# Patient Record
Sex: FEMALE | Race: OTHER | HISPANIC OR LATINO | Employment: FULL TIME | URBAN - METROPOLITAN AREA
[De-identification: names, ages, dates, MRNs, and addresses within clinical notes are randomized per-mention and may not be internally consistent; named-entity substitution may affect disease eponyms.]

---

## 2018-08-10 PROBLEM — D51.3 OTHER DIETARY VITAMIN B12 DEFICIENCY ANEMIA: Status: ACTIVE | Noted: 2018-08-10

## 2018-08-10 PROBLEM — Z98.84 STATUS POST BARIATRIC SURGERY: Status: ACTIVE | Noted: 2018-08-10

## 2018-08-17 DIAGNOSIS — D50.9 IRON DEFICIENCY ANEMIA, UNSPECIFIED IRON DEFICIENCY ANEMIA TYPE: Primary | ICD-10-CM

## 2018-08-17 DIAGNOSIS — E55.9 VITAMIN D DEFICIENCY: ICD-10-CM

## 2018-08-17 DIAGNOSIS — E53.8 VITAMIN B12 DEFICIENCY: ICD-10-CM

## 2018-08-19 ENCOUNTER — TRANSCRIBE ORDERS (OUTPATIENT)
Dept: ADMINISTRATIVE | Facility: HOSPITAL | Age: 38
End: 2018-08-19

## 2018-08-19 ENCOUNTER — APPOINTMENT (OUTPATIENT)
Dept: LAB | Facility: MEDICAL CENTER | Age: 38
End: 2018-08-19
Payer: COMMERCIAL

## 2018-08-19 DIAGNOSIS — E55.9 VITAMIN D DEFICIENCY: ICD-10-CM

## 2018-08-19 DIAGNOSIS — D50.9 IRON DEFICIENCY ANEMIA, UNSPECIFIED IRON DEFICIENCY ANEMIA TYPE: ICD-10-CM

## 2018-08-19 DIAGNOSIS — Z00.8 HEALTH EXAMINATION IN POPULATION SURVEY: Primary | ICD-10-CM

## 2018-08-19 DIAGNOSIS — Z00.8 HEALTH EXAMINATION IN POPULATION SURVEY: ICD-10-CM

## 2018-08-19 DIAGNOSIS — E53.8 VITAMIN B12 DEFICIENCY: ICD-10-CM

## 2018-08-19 LAB
ALBUMIN SERPL BCP-MCNC: 3.6 G/DL (ref 3.5–5)
ALP SERPL-CCNC: 67 U/L (ref 46–116)
ALT SERPL W P-5'-P-CCNC: 32 U/L (ref 12–78)
ANION GAP SERPL CALCULATED.3IONS-SCNC: 4 MMOL/L (ref 4–13)
AST SERPL W P-5'-P-CCNC: 25 U/L (ref 5–45)
BASOPHILS # BLD AUTO: 0.07 THOUSANDS/ΜL (ref 0–0.1)
BASOPHILS NFR BLD AUTO: 1 % (ref 0–1)
BILIRUB SERPL-MCNC: 0.52 MG/DL (ref 0.2–1)
BUN SERPL-MCNC: 17 MG/DL (ref 5–25)
CALCIUM SERPL-MCNC: 8.3 MG/DL (ref 8.3–10.1)
CHLORIDE SERPL-SCNC: 104 MMOL/L (ref 100–108)
CHOLEST SERPL-MCNC: 149 MG/DL (ref 50–200)
CO2 SERPL-SCNC: 28 MMOL/L (ref 21–32)
CREAT SERPL-MCNC: 0.75 MG/DL (ref 0.6–1.3)
EOSINOPHIL # BLD AUTO: 0.11 THOUSAND/ΜL (ref 0–0.61)
EOSINOPHIL NFR BLD AUTO: 2 % (ref 0–6)
ERYTHROCYTE [DISTWIDTH] IN BLOOD BY AUTOMATED COUNT: 13.6 % (ref 11.6–15.1)
EST. AVERAGE GLUCOSE BLD GHB EST-MCNC: 105 MG/DL
GFR SERPL CREATININE-BSD FRML MDRD: 104 ML/MIN/1.73SQ M
GLUCOSE P FAST SERPL-MCNC: 77 MG/DL (ref 65–99)
HBA1C MFR BLD: 5.3 % (ref 4.2–6.3)
HCT VFR BLD AUTO: 47.3 % (ref 34.8–46.1)
HDLC SERPL-MCNC: 94 MG/DL (ref 40–60)
HGB BLD-MCNC: 15.5 G/DL (ref 11.5–15.4)
IMM GRANULOCYTES # BLD AUTO: 0.01 THOUSAND/UL (ref 0–0.2)
IMM GRANULOCYTES NFR BLD AUTO: 0 % (ref 0–2)
IRON SERPL-MCNC: 79 UG/DL (ref 50–170)
LDLC SERPL CALC-MCNC: 43 MG/DL (ref 0–100)
LYMPHOCYTES # BLD AUTO: 2.13 THOUSANDS/ΜL (ref 0.6–4.47)
LYMPHOCYTES NFR BLD AUTO: 44 % (ref 14–44)
MCH RBC QN AUTO: 29.9 PG (ref 26.8–34.3)
MCHC RBC AUTO-ENTMCNC: 32.8 G/DL (ref 31.4–37.4)
MCV RBC AUTO: 91 FL (ref 82–98)
MONOCYTES # BLD AUTO: 0.55 THOUSAND/ΜL (ref 0.17–1.22)
MONOCYTES NFR BLD AUTO: 11 % (ref 4–12)
NEUTROPHILS # BLD AUTO: 2.07 THOUSANDS/ΜL (ref 1.85–7.62)
NEUTS SEG NFR BLD AUTO: 42 % (ref 43–75)
NONHDLC SERPL-MCNC: 55 MG/DL
NRBC BLD AUTO-RTO: 0 /100 WBCS
PLATELET # BLD AUTO: 303 THOUSANDS/UL (ref 149–390)
PMV BLD AUTO: 10.2 FL (ref 8.9–12.7)
POTASSIUM SERPL-SCNC: 4.1 MMOL/L (ref 3.5–5.3)
PROT SERPL-MCNC: 7.5 G/DL (ref 6.4–8.2)
RBC # BLD AUTO: 5.19 MILLION/UL (ref 3.81–5.12)
SODIUM SERPL-SCNC: 136 MMOL/L (ref 136–145)
TRIGL SERPL-MCNC: 61 MG/DL
TSH SERPL DL<=0.05 MIU/L-ACNC: 1.68 UIU/ML (ref 0.36–3.74)
VIT B12 SERPL-MCNC: 582 PG/ML (ref 100–900)
WBC # BLD AUTO: 4.94 THOUSAND/UL (ref 4.31–10.16)

## 2018-08-19 PROCEDURE — 84443 ASSAY THYROID STIM HORMONE: CPT

## 2018-08-19 PROCEDURE — 83540 ASSAY OF IRON: CPT

## 2018-08-19 PROCEDURE — 83036 HEMOGLOBIN GLYCOSYLATED A1C: CPT

## 2018-08-19 PROCEDURE — 82607 VITAMIN B-12: CPT

## 2018-08-19 PROCEDURE — 85025 COMPLETE CBC W/AUTO DIFF WBC: CPT

## 2018-08-19 PROCEDURE — 80061 LIPID PANEL: CPT

## 2018-08-19 PROCEDURE — 36415 COLL VENOUS BLD VENIPUNCTURE: CPT

## 2018-08-19 PROCEDURE — 80053 COMPREHEN METABOLIC PANEL: CPT

## 2019-03-07 ENCOUNTER — TELEPHONE (OUTPATIENT)
Dept: FAMILY MEDICINE CLINIC | Facility: CLINIC | Age: 39
End: 2019-03-07

## 2019-03-07 DIAGNOSIS — R05.9 COUGH: Primary | ICD-10-CM

## 2019-03-07 RX ORDER — PROMETHAZINE HYDROCHLORIDE AND CODEINE PHOSPHATE 6.25; 1 MG/5ML; MG/5ML
5 SYRUP ORAL EVERY 4 HOURS PRN
Qty: 120 ML | Refills: 0 | Status: SHIPPED | OUTPATIENT
Start: 2019-03-07 | End: 2019-03-12 | Stop reason: SDUPTHER

## 2019-03-12 DIAGNOSIS — R05.9 COUGH: ICD-10-CM

## 2019-03-12 RX ORDER — PROMETHAZINE HYDROCHLORIDE AND CODEINE PHOSPHATE 6.25; 1 MG/5ML; MG/5ML
5 SYRUP ORAL EVERY 4 HOURS PRN
Qty: 120 ML | Refills: 0 | Status: SHIPPED | OUTPATIENT
Start: 2019-03-12 | End: 2019-05-08 | Stop reason: ALTCHOICE

## 2019-03-12 NOTE — TELEPHONE ENCOUNTER
Patient still has a bad cough keeping her up at night and is all out of the medication would like to know if a refill can be sent to the pharmacy

## 2019-05-08 ENCOUNTER — OFFICE VISIT (OUTPATIENT)
Dept: FAMILY MEDICINE CLINIC | Facility: CLINIC | Age: 39
End: 2019-05-08
Payer: COMMERCIAL

## 2019-05-08 VITALS
DIASTOLIC BLOOD PRESSURE: 70 MMHG | HEART RATE: 79 BPM | WEIGHT: 148 LBS | BODY MASS INDEX: 27.23 KG/M2 | SYSTOLIC BLOOD PRESSURE: 100 MMHG | TEMPERATURE: 97.3 F | OXYGEN SATURATION: 98 % | HEIGHT: 62 IN

## 2019-05-08 DIAGNOSIS — Z00.01 ENCOUNTER FOR WELL ADULT EXAM WITH ABNORMAL FINDINGS: Primary | ICD-10-CM

## 2019-05-08 DIAGNOSIS — Z23 NEED FOR PNEUMOCOCCAL VACCINE: ICD-10-CM

## 2019-05-08 DIAGNOSIS — B35.4 TINEA CORPORIS: ICD-10-CM

## 2019-05-08 PROCEDURE — 99395 PREV VISIT EST AGE 18-39: CPT | Performed by: FAMILY MEDICINE

## 2019-05-08 PROCEDURE — 90471 IMMUNIZATION ADMIN: CPT

## 2019-05-08 PROCEDURE — 90732 PPSV23 VACC 2 YRS+ SUBQ/IM: CPT

## 2019-05-08 RX ORDER — CLOTRIMAZOLE AND BETAMETHASONE DIPROPIONATE 10; .64 MG/G; MG/G
CREAM TOPICAL 2 TIMES DAILY
COMMUNITY
End: 2019-05-08 | Stop reason: CLARIF

## 2019-05-08 RX ORDER — NYSTATIN 100000 [USP'U]/G
POWDER TOPICAL 4 TIMES DAILY
Status: ON HOLD | COMMUNITY
End: 2019-08-19

## 2019-05-24 ENCOUNTER — OFFICE VISIT (OUTPATIENT)
Dept: FAMILY MEDICINE CLINIC | Facility: CLINIC | Age: 39
End: 2019-05-24
Payer: COMMERCIAL

## 2019-05-24 VITALS
BODY MASS INDEX: 26.68 KG/M2 | DIASTOLIC BLOOD PRESSURE: 62 MMHG | OXYGEN SATURATION: 99 % | TEMPERATURE: 97.7 F | WEIGHT: 145 LBS | RESPIRATION RATE: 16 BRPM | SYSTOLIC BLOOD PRESSURE: 98 MMHG | HEART RATE: 60 BPM | HEIGHT: 62 IN

## 2019-05-24 DIAGNOSIS — B35.4 TINEA CORPORIS: Primary | ICD-10-CM

## 2019-05-24 PROCEDURE — 99213 OFFICE O/P EST LOW 20 MIN: CPT | Performed by: FAMILY MEDICINE

## 2019-05-24 PROCEDURE — 3008F BODY MASS INDEX DOCD: CPT | Performed by: FAMILY MEDICINE

## 2019-06-13 ENCOUNTER — OFFICE VISIT (OUTPATIENT)
Dept: PLASTIC SURGERY | Facility: MEDICAL CENTER | Age: 39
End: 2019-06-13
Payer: COMMERCIAL

## 2019-06-13 VITALS — WEIGHT: 146.5 LBS | HEIGHT: 63 IN | BODY MASS INDEX: 25.96 KG/M2

## 2019-06-13 DIAGNOSIS — E65 PANNUS, ABDOMINAL: ICD-10-CM

## 2019-06-13 DIAGNOSIS — T85.44XA CAPSULAR CONTRACTURE OF BREAST IMPLANT, INITIAL ENCOUNTER: ICD-10-CM

## 2019-06-13 DIAGNOSIS — L30.4 INTERTRIGO: ICD-10-CM

## 2019-06-13 DIAGNOSIS — Z98.84 STATUS POST BARIATRIC SURGERY: Primary | ICD-10-CM

## 2019-06-13 DIAGNOSIS — B35.4 TINEA CORPORIS: ICD-10-CM

## 2019-06-13 PROCEDURE — 99244 OFF/OP CNSLTJ NEW/EST MOD 40: CPT | Performed by: SURGERY

## 2019-07-11 ENCOUNTER — TELEPHONE (OUTPATIENT)
Dept: PLASTIC SURGERY | Facility: CLINIC | Age: 39
End: 2019-07-11

## 2019-07-30 ENCOUNTER — APPOINTMENT (OUTPATIENT)
Dept: LAB | Facility: CLINIC | Age: 39
End: 2019-07-30
Payer: COMMERCIAL

## 2019-07-30 DIAGNOSIS — Z98.84 S/P GASTRIC BYPASS: ICD-10-CM

## 2019-07-30 DIAGNOSIS — Z13.29 SCREENING FOR THYROID DISORDER: ICD-10-CM

## 2019-07-30 DIAGNOSIS — E65 PANNUS, ABDOMINAL: ICD-10-CM

## 2019-07-30 DIAGNOSIS — Z13.220 LIPID SCREENING: Primary | ICD-10-CM

## 2019-07-30 DIAGNOSIS — Z13.220 LIPID SCREENING: ICD-10-CM

## 2019-07-30 LAB
ANION GAP SERPL CALCULATED.3IONS-SCNC: 6 MMOL/L (ref 4–13)
BASOPHILS # BLD AUTO: 0.05 THOUSANDS/ΜL (ref 0–0.1)
BASOPHILS NFR BLD AUTO: 1 % (ref 0–1)
BUN SERPL-MCNC: 9 MG/DL (ref 5–25)
CALCIUM SERPL-MCNC: 8.1 MG/DL (ref 8.3–10.1)
CHLORIDE SERPL-SCNC: 106 MMOL/L (ref 100–108)
CHOLEST SERPL-MCNC: 140 MG/DL (ref 50–200)
CO2 SERPL-SCNC: 26 MMOL/L (ref 21–32)
CREAT SERPL-MCNC: 0.72 MG/DL (ref 0.6–1.3)
EOSINOPHIL # BLD AUTO: 0.13 THOUSAND/ΜL (ref 0–0.61)
EOSINOPHIL NFR BLD AUTO: 2 % (ref 0–6)
ERYTHROCYTE [DISTWIDTH] IN BLOOD BY AUTOMATED COUNT: 13.7 % (ref 11.6–15.1)
GFR SERPL CREATININE-BSD FRML MDRD: 108 ML/MIN/1.73SQ M
GLUCOSE P FAST SERPL-MCNC: 78 MG/DL (ref 65–99)
HCT VFR BLD AUTO: 41.7 % (ref 34.8–46.1)
HDLC SERPL-MCNC: 85 MG/DL (ref 40–60)
HGB BLD-MCNC: 13.7 G/DL (ref 11.5–15.4)
IMM GRANULOCYTES # BLD AUTO: 0.01 THOUSAND/UL (ref 0–0.2)
IMM GRANULOCYTES NFR BLD AUTO: 0 % (ref 0–2)
LDLC SERPL CALC-MCNC: 43 MG/DL (ref 0–100)
LYMPHOCYTES # BLD AUTO: 2.66 THOUSANDS/ΜL (ref 0.6–4.47)
LYMPHOCYTES NFR BLD AUTO: 39 % (ref 14–44)
MCH RBC QN AUTO: 30.6 PG (ref 26.8–34.3)
MCHC RBC AUTO-ENTMCNC: 32.9 G/DL (ref 31.4–37.4)
MCV RBC AUTO: 93 FL (ref 82–98)
MONOCYTES # BLD AUTO: 0.62 THOUSAND/ΜL (ref 0.17–1.22)
MONOCYTES NFR BLD AUTO: 9 % (ref 4–12)
NEUTROPHILS # BLD AUTO: 3.33 THOUSANDS/ΜL (ref 1.85–7.62)
NEUTS SEG NFR BLD AUTO: 49 % (ref 43–75)
NONHDLC SERPL-MCNC: 55 MG/DL
NRBC BLD AUTO-RTO: 0 /100 WBCS
PLATELET # BLD AUTO: 312 THOUSANDS/UL (ref 149–390)
PMV BLD AUTO: 10.6 FL (ref 8.9–12.7)
POTASSIUM SERPL-SCNC: 3.9 MMOL/L (ref 3.5–5.3)
RBC # BLD AUTO: 4.47 MILLION/UL (ref 3.81–5.12)
SODIUM SERPL-SCNC: 138 MMOL/L (ref 136–145)
TRIGL SERPL-MCNC: 61 MG/DL
TSH SERPL DL<=0.05 MIU/L-ACNC: 1.66 UIU/ML (ref 0.36–3.74)
WBC # BLD AUTO: 6.8 THOUSAND/UL (ref 4.31–10.16)

## 2019-07-30 PROCEDURE — 85025 COMPLETE CBC W/AUTO DIFF WBC: CPT

## 2019-07-30 PROCEDURE — 80061 LIPID PANEL: CPT

## 2019-07-30 PROCEDURE — 36415 COLL VENOUS BLD VENIPUNCTURE: CPT

## 2019-07-30 PROCEDURE — 84443 ASSAY THYROID STIM HORMONE: CPT

## 2019-07-30 PROCEDURE — 80048 BASIC METABOLIC PNL TOTAL CA: CPT

## 2019-08-02 ENCOUNTER — CONSULT (OUTPATIENT)
Dept: PLASTIC SURGERY | Facility: CLINIC | Age: 39
End: 2019-08-02

## 2019-08-02 VITALS
SYSTOLIC BLOOD PRESSURE: 110 MMHG | DIASTOLIC BLOOD PRESSURE: 70 MMHG | RESPIRATION RATE: 12 BRPM | HEART RATE: 64 BPM | BODY MASS INDEX: 25.69 KG/M2 | HEIGHT: 63 IN | WEIGHT: 145 LBS | TEMPERATURE: 98.5 F

## 2019-08-02 DIAGNOSIS — E65 PANNUS, ABDOMINAL: Primary | ICD-10-CM

## 2019-08-02 PROCEDURE — PREOP: Performed by: PLASTIC SURGERY

## 2019-08-07 RX ORDER — OXYCODONE HYDROCHLORIDE 5 MG/1
5 TABLET ORAL EVERY 6 HOURS PRN
Qty: 20 TABLET | Refills: 0 | Status: SHIPPED | OUTPATIENT
Start: 2019-08-07 | End: 2019-10-31 | Stop reason: ALTCHOICE

## 2019-08-07 RX ORDER — DOCUSATE SODIUM 100 MG/1
100 CAPSULE, LIQUID FILLED ORAL 2 TIMES DAILY
Qty: 42 CAPSULE | Refills: 0 | Status: SHIPPED | OUTPATIENT
Start: 2019-08-07 | End: 2019-10-31 | Stop reason: ALTCHOICE

## 2019-08-07 RX ORDER — GABAPENTIN 300 MG/1
300 CAPSULE ORAL 2 TIMES DAILY
Qty: 30 CAPSULE | Refills: 0 | Status: SHIPPED | OUTPATIENT
Start: 2019-08-07 | End: 2019-10-31 | Stop reason: ALTCHOICE

## 2019-08-07 RX ORDER — ACETAMINOPHEN 500 MG
1000 TABLET ORAL 3 TIMES DAILY
Qty: 126 TABLET | Refills: 0 | Status: SHIPPED | OUTPATIENT
Start: 2019-08-07 | End: 2019-08-28

## 2019-08-19 ENCOUNTER — HOSPITAL ENCOUNTER (OUTPATIENT)
Facility: HOSPITAL | Age: 39
Setting detail: OUTPATIENT SURGERY
Discharge: HOME | End: 2019-08-20
Attending: PLASTIC SURGERY | Admitting: PLASTIC SURGERY
Payer: COMMERCIAL

## 2019-08-19 ENCOUNTER — ANESTHESIA (OUTPATIENT)
Dept: PERIOP | Facility: HOSPITAL | Age: 39
End: 2019-08-19
Payer: COMMERCIAL

## 2019-08-19 ENCOUNTER — ANESTHESIA EVENT (OUTPATIENT)
Dept: PERIOP | Facility: HOSPITAL | Age: 39
End: 2019-08-19
Payer: COMMERCIAL

## 2019-08-19 LAB
HIV 1+2 AB+HIV1 P24 AG SERPL QL IA: NORMAL
HIV1 P24 AG SER QL: NORMAL

## 2019-08-19 PROCEDURE — 86803 HEPATITIS C AB TEST: CPT | Performed by: PLASTIC SURGERY

## 2019-08-19 PROCEDURE — 15830 EXC EXCESSIVE SKIN ABDOMEN: CPT | Performed by: PLASTIC SURGERY

## 2019-08-19 PROCEDURE — NC001 PR NO CHARGE: Performed by: PLASTIC SURGERY

## 2019-08-19 PROCEDURE — 87806 HIV AG W/HIV1&2 ANTB W/OPTIC: CPT | Performed by: PLASTIC SURGERY

## 2019-08-19 PROCEDURE — 87340 HEPATITIS B SURFACE AG IA: CPT | Performed by: PLASTIC SURGERY

## 2019-08-19 RX ORDER — ONDANSETRON 2 MG/ML
4 INJECTION INTRAMUSCULAR; INTRAVENOUS EVERY 6 HOURS PRN
Status: DISCONTINUED | OUTPATIENT
Start: 2019-08-19 | End: 2019-08-20 | Stop reason: HOSPADM

## 2019-08-19 RX ORDER — FENTANYL CITRATE/PF 50 MCG/ML
25 SYRINGE (ML) INJECTION
Status: DISCONTINUED | OUTPATIENT
Start: 2019-08-19 | End: 2019-08-19 | Stop reason: HOSPADM

## 2019-08-19 RX ORDER — DIPHENHYDRAMINE HYDROCHLORIDE 50 MG/ML
25 INJECTION INTRAMUSCULAR; INTRAVENOUS EVERY 6 HOURS PRN
Status: DISCONTINUED | OUTPATIENT
Start: 2019-08-19 | End: 2019-08-20 | Stop reason: HOSPADM

## 2019-08-19 RX ORDER — PROPOFOL 10 MG/ML
INJECTION, EMULSION INTRAVENOUS AS NEEDED
Status: DISCONTINUED | OUTPATIENT
Start: 2019-08-19 | End: 2019-08-19 | Stop reason: SURG

## 2019-08-19 RX ORDER — SODIUM CHLORIDE, SODIUM LACTATE, POTASSIUM CHLORIDE, CALCIUM CHLORIDE 600; 310; 30; 20 MG/100ML; MG/100ML; MG/100ML; MG/100ML
75 INJECTION, SOLUTION INTRAVENOUS CONTINUOUS
Status: DISPENSED | OUTPATIENT
Start: 2019-08-19 | End: 2019-08-20

## 2019-08-19 RX ORDER — SODIUM CHLORIDE, SODIUM LACTATE, POTASSIUM CHLORIDE, CALCIUM CHLORIDE 600; 310; 30; 20 MG/100ML; MG/100ML; MG/100ML; MG/100ML
125 INJECTION, SOLUTION INTRAVENOUS CONTINUOUS
Status: DISCONTINUED | OUTPATIENT
Start: 2019-08-19 | End: 2019-08-20

## 2019-08-19 RX ORDER — HYDROMORPHONE HCL/PF 1 MG/ML
0.5 SYRINGE (ML) INJECTION
Status: DISCONTINUED | OUTPATIENT
Start: 2019-08-19 | End: 2019-08-19 | Stop reason: HOSPADM

## 2019-08-19 RX ORDER — CEFAZOLIN SODIUM 1 G/50ML
1000 SOLUTION INTRAVENOUS ONCE
Status: COMPLETED | OUTPATIENT
Start: 2019-08-19 | End: 2019-08-19

## 2019-08-19 RX ORDER — ONDANSETRON 2 MG/ML
4 INJECTION INTRAMUSCULAR; INTRAVENOUS EVERY 4 HOURS PRN
Status: DISCONTINUED | OUTPATIENT
Start: 2019-08-19 | End: 2019-08-19 | Stop reason: HOSPADM

## 2019-08-19 RX ORDER — DOCUSATE SODIUM 100 MG/1
100 CAPSULE, LIQUID FILLED ORAL 2 TIMES DAILY
Status: DISCONTINUED | OUTPATIENT
Start: 2019-08-19 | End: 2019-08-20 | Stop reason: HOSPADM

## 2019-08-19 RX ORDER — ACETAMINOPHEN 325 MG/1
975 TABLET ORAL EVERY 8 HOURS
Status: DISCONTINUED | OUTPATIENT
Start: 2019-08-19 | End: 2019-08-20 | Stop reason: HOSPADM

## 2019-08-19 RX ORDER — GLYCOPYRROLATE 0.2 MG/ML
INJECTION INTRAMUSCULAR; INTRAVENOUS AS NEEDED
Status: DISCONTINUED | OUTPATIENT
Start: 2019-08-19 | End: 2019-08-19 | Stop reason: SURG

## 2019-08-19 RX ORDER — BUPIVACAINE HYDROCHLORIDE 2.5 MG/ML
INJECTION, SOLUTION EPIDURAL; INFILTRATION; INTRACAUDAL AS NEEDED
Status: DISCONTINUED | OUTPATIENT
Start: 2019-08-19 | End: 2019-08-19 | Stop reason: HOSPADM

## 2019-08-19 RX ORDER — HYDROMORPHONE HCL/PF 1 MG/ML
0.5 SYRINGE (ML) INJECTION EVERY 4 HOURS PRN
Status: DISCONTINUED | OUTPATIENT
Start: 2019-08-19 | End: 2019-08-20 | Stop reason: HOSPADM

## 2019-08-19 RX ORDER — ROCURONIUM BROMIDE 10 MG/ML
INJECTION, SOLUTION INTRAVENOUS AS NEEDED
Status: DISCONTINUED | OUTPATIENT
Start: 2019-08-19 | End: 2019-08-19 | Stop reason: SURG

## 2019-08-19 RX ORDER — CALCIUM CARBONATE 200(500)MG
1000 TABLET,CHEWABLE ORAL DAILY PRN
Status: DISCONTINUED | OUTPATIENT
Start: 2019-08-19 | End: 2019-08-20 | Stop reason: HOSPADM

## 2019-08-19 RX ORDER — SODIUM CHLORIDE, SODIUM LACTATE, POTASSIUM CHLORIDE, CALCIUM CHLORIDE 600; 310; 30; 20 MG/100ML; MG/100ML; MG/100ML; MG/100ML
50 INJECTION, SOLUTION INTRAVENOUS CONTINUOUS
Status: DISCONTINUED | OUTPATIENT
Start: 2019-08-19 | End: 2019-08-20

## 2019-08-19 RX ORDER — NEOSTIGMINE METHYLSULFATE 1 MG/ML
INJECTION INTRAVENOUS AS NEEDED
Status: DISCONTINUED | OUTPATIENT
Start: 2019-08-19 | End: 2019-08-19 | Stop reason: SURG

## 2019-08-19 RX ORDER — MIDAZOLAM HYDROCHLORIDE 1 MG/ML
INJECTION INTRAMUSCULAR; INTRAVENOUS AS NEEDED
Status: DISCONTINUED | OUTPATIENT
Start: 2019-08-19 | End: 2019-08-19 | Stop reason: SURG

## 2019-08-19 RX ORDER — DEXAMETHASONE SODIUM PHOSPHATE 4 MG/ML
INJECTION, SOLUTION INTRA-ARTICULAR; INTRALESIONAL; INTRAMUSCULAR; INTRAVENOUS; SOFT TISSUE AS NEEDED
Status: DISCONTINUED | OUTPATIENT
Start: 2019-08-19 | End: 2019-08-19 | Stop reason: SURG

## 2019-08-19 RX ORDER — FENTANYL CITRATE 50 UG/ML
INJECTION, SOLUTION INTRAMUSCULAR; INTRAVENOUS AS NEEDED
Status: DISCONTINUED | OUTPATIENT
Start: 2019-08-19 | End: 2019-08-19 | Stop reason: SURG

## 2019-08-19 RX ORDER — GABAPENTIN 300 MG/1
300 CAPSULE ORAL ONCE
Status: DISCONTINUED | OUTPATIENT
Start: 2019-08-19 | End: 2019-08-19 | Stop reason: HOSPADM

## 2019-08-19 RX ORDER — GABAPENTIN 300 MG/1
300 CAPSULE ORAL 3 TIMES DAILY
Status: DISCONTINUED | OUTPATIENT
Start: 2019-08-19 | End: 2019-08-20 | Stop reason: HOSPADM

## 2019-08-19 RX ORDER — HYDROMORPHONE HCL/PF 1 MG/ML
SYRINGE (ML) INJECTION AS NEEDED
Status: DISCONTINUED | OUTPATIENT
Start: 2019-08-19 | End: 2019-08-19 | Stop reason: SURG

## 2019-08-19 RX ORDER — OXYCODONE HYDROCHLORIDE 5 MG/1
5 TABLET ORAL EVERY 4 HOURS PRN
Status: DISCONTINUED | OUTPATIENT
Start: 2019-08-19 | End: 2019-08-20 | Stop reason: HOSPADM

## 2019-08-19 RX ORDER — ACETAMINOPHEN 325 MG/1
975 TABLET ORAL ONCE
Status: DISCONTINUED | OUTPATIENT
Start: 2019-08-19 | End: 2019-08-19 | Stop reason: HOSPADM

## 2019-08-19 RX ORDER — ONDANSETRON 2 MG/ML
INJECTION INTRAMUSCULAR; INTRAVENOUS AS NEEDED
Status: DISCONTINUED | OUTPATIENT
Start: 2019-08-19 | End: 2019-08-19 | Stop reason: SURG

## 2019-08-19 RX ADMIN — NEOSTIGMINE METHYLSULFATE 3 MG: 1 INJECTION INTRAVENOUS at 16:53

## 2019-08-19 RX ADMIN — DEXAMETHASONE SODIUM PHOSPHATE 4 MG: 4 INJECTION, SOLUTION INTRAMUSCULAR; INTRAVENOUS at 15:00

## 2019-08-19 RX ADMIN — HYDROMORPHONE HYDROCHLORIDE 0.5 MG: 1 INJECTION, SOLUTION INTRAMUSCULAR; INTRAVENOUS; SUBCUTANEOUS at 23:08

## 2019-08-19 RX ADMIN — PROPOFOL 150 MG: 10 INJECTION, EMULSION INTRAVENOUS at 15:00

## 2019-08-19 RX ADMIN — FENTANYL CITRATE 100 MCG: 50 INJECTION INTRAMUSCULAR; INTRAVENOUS at 15:00

## 2019-08-19 RX ADMIN — FENTANYL CITRATE 50 MCG: 50 INJECTION INTRAMUSCULAR; INTRAVENOUS at 16:03

## 2019-08-19 RX ADMIN — ROCURONIUM BROMIDE 20 MG: 10 INJECTION, SOLUTION INTRAVENOUS at 16:06

## 2019-08-19 RX ADMIN — GLYCOPYRROLATE 0.2 MG: 0.2 INJECTION, SOLUTION INTRAMUSCULAR; INTRAVENOUS at 15:12

## 2019-08-19 RX ADMIN — HYDROMORPHONE HYDROCHLORIDE 0.5 MG: 1 INJECTION, SOLUTION INTRAMUSCULAR; INTRAVENOUS; SUBCUTANEOUS at 17:59

## 2019-08-19 RX ADMIN — FENTANYL CITRATE 50 MCG: 50 INJECTION INTRAMUSCULAR; INTRAVENOUS at 15:36

## 2019-08-19 RX ADMIN — HYDROMORPHONE HYDROCHLORIDE 0.5 MG: 1 INJECTION, SOLUTION INTRAMUSCULAR; INTRAVENOUS; SUBCUTANEOUS at 18:05

## 2019-08-19 RX ADMIN — DOCUSATE SODIUM 100 MG: 100 CAPSULE, LIQUID FILLED ORAL at 19:38

## 2019-08-19 RX ADMIN — ONDANSETRON 4 MG: 2 INJECTION INTRAMUSCULAR; INTRAVENOUS at 16:45

## 2019-08-19 RX ADMIN — MIDAZOLAM HYDROCHLORIDE 2 MG: 1 INJECTION, SOLUTION INTRAMUSCULAR; INTRAVENOUS at 14:50

## 2019-08-19 RX ADMIN — OXYCODONE HYDROCHLORIDE 5 MG: 5 TABLET ORAL at 19:38

## 2019-08-19 RX ADMIN — GABAPENTIN 300 MG: 300 CAPSULE ORAL at 20:16

## 2019-08-19 RX ADMIN — HYDROMORPHONE HYDROCHLORIDE 1 MG: 1 INJECTION, SOLUTION INTRAMUSCULAR; INTRAVENOUS; SUBCUTANEOUS at 16:05

## 2019-08-19 RX ADMIN — SODIUM CHLORIDE, SODIUM LACTATE, POTASSIUM CHLORIDE, AND CALCIUM CHLORIDE: .6; .31; .03; .02 INJECTION, SOLUTION INTRAVENOUS at 11:04

## 2019-08-19 RX ADMIN — CEFAZOLIN SODIUM 1000 MG: 1 SOLUTION INTRAVENOUS at 14:55

## 2019-08-19 RX ADMIN — ROCURONIUM BROMIDE 40 MG: 10 INJECTION, SOLUTION INTRAVENOUS at 15:00

## 2019-08-19 RX ADMIN — GLYCOPYRROLATE 0.4 MG: 0.2 INJECTION, SOLUTION INTRAMUSCULAR; INTRAVENOUS at 16:53

## 2019-08-19 RX ADMIN — ACETAMINOPHEN 975 MG: 325 TABLET, FILM COATED ORAL at 19:38

## 2019-08-19 RX ADMIN — LIDOCAINE HYDROCHLORIDE 50 MG: 20 INJECTION, SOLUTION INTRAVENOUS at 15:00

## 2019-08-19 RX ADMIN — SODIUM CHLORIDE, SODIUM LACTATE, POTASSIUM CHLORIDE, AND CALCIUM CHLORIDE: .6; .31; .03; .02 INJECTION, SOLUTION INTRAVENOUS at 16:06

## 2019-08-19 RX ADMIN — SODIUM CHLORIDE, SODIUM LACTATE, POTASSIUM CHLORIDE, AND CALCIUM CHLORIDE 75 ML/HR: .6; .31; .03; .02 INJECTION, SOLUTION INTRAVENOUS at 19:38

## 2019-08-19 RX ADMIN — ROCURONIUM BROMIDE 20 MG: 10 INJECTION, SOLUTION INTRAVENOUS at 15:35

## 2019-08-19 RX ADMIN — FENTANYL CITRATE 25 MCG: 50 INJECTION, SOLUTION INTRAMUSCULAR; INTRAVENOUS at 17:45

## 2019-08-19 RX ADMIN — HYDROMORPHONE HYDROCHLORIDE 0.5 MG: 1 INJECTION, SOLUTION INTRAMUSCULAR; INTRAVENOUS; SUBCUTANEOUS at 17:52

## 2019-08-19 RX ADMIN — FENTANYL CITRATE 25 MCG: 50 INJECTION, SOLUTION INTRAMUSCULAR; INTRAVENOUS at 17:37

## 2019-08-19 NOTE — ANESTHESIA PREPROCEDURE EVALUATION
Review of Systems/Medical History  Patient summary reviewed  Chart reviewed  No history of anesthetic complications     Cardiovascular  Exercise tolerance (METS): >4,     Pulmonary       GI/Hepatic            Endo/Other     GYN       Hematology  No anemia ,     Musculoskeletal       Neurology   Psychology           Physical Exam    Airway    Mallampati score: I  TM Distance: >3 FB  Neck ROM: full     Dental   No notable dental hx     Cardiovascular      Pulmonary      Other Findings        Anesthesia Plan  ASA Score- 2     Anesthesia Type- general with ASA Monitors  Additional Monitors:   Airway Plan: ETT  Comment: General anesthesia, endotracheal tube; standard ASA monitors  Risks and benefits discussed with patient; patient consented and agrees to proceed  I saw and evaluated the patient  If seen with CRNA, we have discussed the anesthetic plan and I am in agreement that the plan is appropriate for the patient  S/p tubal ligation - pt denies any possible pregnancy  Plan Factors-    Induction- intravenous  Postoperative Plan- Plan for postoperative opioid use  Planned trial extubation    Informed Consent- Anesthetic plan and risks discussed with patient  I personally reviewed this patient with the CRNA  Discussed and agreed on the Anesthesia Plan with the CRNA  Sawyer Scruggs

## 2019-08-20 ENCOUNTER — TRANSITIONAL CARE MANAGEMENT (OUTPATIENT)
Dept: FAMILY MEDICINE CLINIC | Facility: CLINIC | Age: 39
End: 2019-08-20

## 2019-08-20 ENCOUNTER — TELEPHONE (OUTPATIENT)
Dept: PLASTIC SURGERY | Facility: CLINIC | Age: 39
End: 2019-08-20

## 2019-08-20 VITALS
HEART RATE: 72 BPM | OXYGEN SATURATION: 99 % | HEIGHT: 64 IN | BODY MASS INDEX: 24.75 KG/M2 | SYSTOLIC BLOOD PRESSURE: 101 MMHG | DIASTOLIC BLOOD PRESSURE: 50 MMHG | RESPIRATION RATE: 18 BRPM | TEMPERATURE: 98.2 F | WEIGHT: 145 LBS

## 2019-08-20 LAB
HBV SURFACE AG SER QL: NORMAL
HCV AB SER QL: NORMAL

## 2019-08-20 PROCEDURE — 99024 POSTOP FOLLOW-UP VISIT: CPT | Performed by: PLASTIC SURGERY

## 2019-08-20 RX ADMIN — OXYCODONE HYDROCHLORIDE 5 MG: 5 TABLET ORAL at 10:24

## 2019-08-20 RX ADMIN — DOCUSATE SODIUM 100 MG: 100 CAPSULE, LIQUID FILLED ORAL at 08:25

## 2019-08-20 RX ADMIN — ACETAMINOPHEN 975 MG: 325 TABLET, FILM COATED ORAL at 03:07

## 2019-08-20 RX ADMIN — GABAPENTIN 300 MG: 300 CAPSULE ORAL at 08:25

## 2019-08-20 RX ADMIN — OXYCODONE HYDROCHLORIDE 5 MG: 5 TABLET ORAL at 06:35

## 2019-08-26 ENCOUNTER — OFFICE VISIT (OUTPATIENT)
Dept: PLASTIC SURGERY | Facility: CLINIC | Age: 39
End: 2019-08-26

## 2019-08-26 VITALS — HEIGHT: 64 IN | BODY MASS INDEX: 24.75 KG/M2 | WEIGHT: 145 LBS

## 2019-08-26 DIAGNOSIS — Z98.890 POST-OPERATIVE STATE: Primary | ICD-10-CM

## 2019-08-26 PROCEDURE — 99024 POSTOP FOLLOW-UP VISIT: CPT | Performed by: PLASTIC SURGERY

## 2019-08-26 NOTE — PATIENT INSTRUCTIONS
PATIENT WILL CONTINUE WITH DRAIN CARE AND IF THE LEFT DRAIN OUTPUT DECREASES BELOW 20 CC  CALL THIS WEEK FOR AN APPT WITH THE NURSE  PATIENT WILL FOLLOW UP IN 2 WEEKS WITH DR aRdha Vance

## 2019-08-26 NOTE — PROGRESS NOTES
Rukhsana Pimentel is a 41 y o  Female status post PANNICULECTOMY done on 8/19/19  The patient is on the office today for vac removal  The vac was removed  The incision line is dry and intact  Steri strips applied to incision line and aquaphor to the umbilicus  The bilateral drain will stay in place output of left 20 cc and on the right more than 30 cc  The patietn will follow up in one week for drain check and then in two weeks with Dr Susu Aranda  Photographs were taken

## 2019-08-27 ENCOUNTER — OFFICE VISIT (OUTPATIENT)
Dept: FAMILY MEDICINE CLINIC | Facility: CLINIC | Age: 39
End: 2019-08-27
Payer: COMMERCIAL

## 2019-08-27 VITALS
WEIGHT: 143 LBS | DIASTOLIC BLOOD PRESSURE: 60 MMHG | HEART RATE: 79 BPM | HEIGHT: 64 IN | TEMPERATURE: 98.1 F | OXYGEN SATURATION: 100 % | BODY MASS INDEX: 24.41 KG/M2 | SYSTOLIC BLOOD PRESSURE: 100 MMHG

## 2019-08-27 DIAGNOSIS — E65 PANNUS, ABDOMINAL: Primary | ICD-10-CM

## 2019-08-27 PROCEDURE — 99496 TRANSJ CARE MGMT HIGH F2F 7D: CPT | Performed by: FAMILY MEDICINE

## 2019-08-27 NOTE — PROGRESS NOTES
Assessment/Plan:     Pannus, abdominal  Status post recent surgery in August 18, 2019 patient still has drainage on patient will continue to follow up with the surgeon InCase of fever or dysuria cough to call the office       Diagnoses and all orders for this visit:    Pannus, abdominal         Subjective:     Patient ID: Hugo White is a 41 y o  female  Patient and office status post the hospitalization patient and did the have the history of gastric bypass and she lost weight and she did have a pannus abdomen and she start having some rash in and the problem with it and have a surgery in August /18/19 and patient was stable to discharge with the pain medication today in the office she deny any fever no cough no dysuria no increased frequency urination no blood in the urine no constipation she is taking the oxycodone for the pain at night and during the day she is taking Tylenol  The review hospital record with the patient and medication with the patient      Review of Systems   Constitutional: Negative for fatigue and fever  HENT: Negative for ear pain, sinus pressure, sinus pain and sore throat  Eyes: Negative for pain and redness  Respiratory: Negative for cough, chest tightness and shortness of breath  Cardiovascular: Negative for chest pain, palpitations and leg swelling  Gastrointestinal: Negative for abdominal pain, blood in stool, constipation, diarrhea and nausea  Genitourinary: Negative for flank pain, frequency and hematuria  Musculoskeletal: Negative for back pain and joint swelling  Skin: Negative for rash  Neurological: Negative for dizziness, numbness and headaches  Hematological: Does not bruise/bleed easily  Objective:     Physical Exam   Constitutional: She is oriented to person, place, and time  She appears well-developed and well-nourished  HENT:   Head: Normocephalic     Right Ear: External ear normal    Left Ear: External ear normal    Eyes: Conjunctivae and EOM are normal  Right eye exhibits no discharge  Left eye exhibits no discharge  Neck: No JVD present  Cardiovascular: Normal rate, regular rhythm and normal heart sounds  Exam reveals no gallop  No murmur heard  Pulmonary/Chest: Effort normal  No respiratory distress  She has no wheezes  She has no rales  She exhibits no tenderness  Abdominal: She exhibits no mass  There is no tenderness  There is no rebound  Site of incision no erythema no tenderness no drainage ,drainge tube in place   Musculoskeletal: She exhibits no edema or tenderness  Neurological: She is alert and oriented to person, place, and time  Skin: No rash noted  No erythema  Vitals:    08/27/19 0836   BP: 100/60   Pulse: 79   Temp: 98 1 °F (36 7 °C)   TempSrc: Tympanic   SpO2: 100%   Weight: 64 9 kg (143 lb)   Height: 5' 3 5" (1 613 m)       Transitional Care Management Review:  Stephanie Stoddard is a 41 y o  female here for TCM follow up  During the TCM phone call patient stated:    TCM Call (since 7/28/2019)     Date and time call was made  8/20/2019  4:07 PM    Hospital care reviewed  Records reviewed    Patient was hospitialized at  [address] [place] [address]    Date of Admission  08/19/19    Date of discharge  08/20/19    Diagnosis  panniculectomy     Disposition  Home    Were the patients medications reviewed and updated  No    Current Symptoms  None      TCM Call (since 7/28/2019)     Post hospital issues  None    Should patient be enrolled in anticoag monitoring? No    Scheduled for follow up?   Yes    Referrals needed  none     Did you obtain your prescribed medications  Yes    Do you need help managing your prescriptions or medications  No    Is transportation to your appointment needed  No    I have advised the patient to call PCP with any new or worsening symptoms  beena BOWIE     Living Arrangements  Spouse or Significiant other    Support System  Family    The type of support provided  Emotional; Financial; Physical    Do you have social support  Yes, as much as I need    Are you recieving any outpatient services  No    Are you recieving home care services  No    Are you using any community resources  No    Current waiver services  No    Have you fallen in the last 12 months  No    Interperter language line needed  No    Counseling  Patient    Counseling topics  Importance of RX compliance          Lionel Grace MD

## 2019-08-28 NOTE — ASSESSMENT & PLAN NOTE
Status post recent surgery in August 18, 2019 patient still has drainage on patient will continue to follow up with the surgeon InCase of fever or dysuria cough to call the office

## 2019-09-03 ENCOUNTER — OFFICE VISIT (OUTPATIENT)
Dept: PLASTIC SURGERY | Facility: CLINIC | Age: 39
End: 2019-09-03

## 2019-09-03 VITALS — WEIGHT: 142.3 LBS | HEIGHT: 63 IN | BODY MASS INDEX: 25.21 KG/M2

## 2019-09-03 DIAGNOSIS — Z98.890 STATUS POST PANNICULECTOMY: Primary | ICD-10-CM

## 2019-09-03 PROCEDURE — 99024 POSTOP FOLLOW-UP VISIT: CPT | Performed by: PHYSICIAN ASSISTANT

## 2019-09-03 NOTE — PROGRESS NOTES
08/19/19 1458         Procedure: PANNICULECTOMY (N/A Abdomen)   Anesthesia type: general     Incisions are clean dry intact after steristrips were removed  Bacitracin applied and covered with ABD's then binder  Drains were removed without difficulty  Pt can shower with mild soap and apply dressings as above  She will followup in 2 weeks

## 2019-09-11 ENCOUNTER — TELEPHONE (OUTPATIENT)
Dept: PLASTIC SURGERY | Facility: CLINIC | Age: 39
End: 2019-09-11

## 2019-09-11 NOTE — LETTER
September 11, 2019     Patient: Parmjit Shaffer   YOB: 1980           To Whom it May Concern:    Adrian Wallace is under my professional care  She may return to work on 09/16/2019 with no restrictions       If you have any questions or concerns, please don't hesitate to call           Sincerely,          Tala Kenny PA-C      CC: No Recipients

## 2019-09-18 ENCOUNTER — TELEPHONE (OUTPATIENT)
Dept: PLASTIC SURGERY | Facility: CLINIC | Age: 39
End: 2019-09-18

## 2019-09-18 NOTE — TELEPHONE ENCOUNTER
Rec'd email from [place], [address] [place] [address], that stated:     "Hey,  Pt called and c/x for tomorrow  She said all is ok and really doesnt think it is necessary for another post op  I told her I would speak with you and call her back if an appt needs to be made  I did document in the notes that she stated all was fine  Let me know "    Called patient, scheduled her a 2 month f/u

## 2019-10-06 NOTE — PROGRESS NOTES
Assessment/Plan   Diagnoses and all orders for this visit:    Pannus, abdominal  -     gabapentin (NEURONTIN) 300 mg capsule; Take 1 capsule (300 mg total) by mouth 2 (two) times a day  -     acetaminophen (TYLENOL) 500 mg tablet; Take 2 tablets (1,000 mg total) by mouth 3 (three) times a day for 21 days  -     oxyCODONE (ROXICODONE) 5 mg immediate release tablet; Take 1 tablet (5 mg total) by mouth every 6 (six) hours as needed for moderate painMax Daily Amount: 20 mg (Patient not taking: Reported on 9/3/2019)  -     docusate sodium (COLACE) 100 mg capsule; Take 1 capsule (100 mg total) by mouth 2 (two) times a day for 21 days      During today's 30 minute visit, all of which was dedicated to counseling, I reviewed the anticipated preoperative, intraoperative, and postoperative courses  I informed her that the nurses will contact the patient the day prior to surgery in order to discuss orientation and logistical information  I will then see her the day of surgery where I will answer any last minute questions and perform my preoperative markings  We will then proceed to the operating room for an anticipated 2 hour procedure under general anesthesia, specifically a panniculectomy  For each procedure, I reviewed the incisions/scars, duration, recovery time, postoperative restrictions, and follow-up  When applicable, I discussed hospitalization, dressing changes, drains, and donor site morbidity        All risks, benefits, and options, including but not limited to, pain, scarring, bleeding, infection, asymmetry, contour deformity, damage to adjacent nerves, vessels, and organs, hematoma, seroma, paresthesias, anesthesia (temporary versus permanent), skin necrosis, fat necrosis, flap necrosis, wound dehiscence with need for healing by secondary intention and postoperative dressing changes, hypertrophic and/or keloid scar formation, deep venous thrombosis, pulmonary embolism, recurrence, and need for revision and/or reoperation were fully explained to the patient  All questions were answered  Once full understanding of the anticipated procedure was verified, informed consent was obtained  The patient will have an expected six-week postoperative recovery period during which time she will have activity restrictions  Specifically, this includes, but is not limited to, avoidance of heavy lifting (nothing heavier than a gallon of milk), avoidance of strenuous activity, avoidance of any activity which makes her hurt or perspire, avoidance of anything that places tension across the incisions, avoidance of submerging the incisions or operative area in water (including bathing in a bathtub, swimming, etc ), and avoidance of dirty, yanira, or contaminated environments  An abdominal binder would be recommended to be worn 24/7 for 6 weeks, except when bathing or when washing  It was emphasized to the patient that postoperatively, it is mandated to employ a high-protein, low salt diet, take deep breaths in order to avoid atelectasis, and ambulate at least 5 times a day in order to avoid deep venous thrombosis and pulmonary embolism  At the conclusion of our conversation, the patient wished to proceed with surgery  [facility] [id number] [facility]   [address] [place] [address] [address]   [address] [place] [address]   Office: [phone]      Subjective   Patient ID: Shabnam Cheng is a 42 y o  female  Shabnam Cheng is being seen today for preoperative evaluation  Since last visit, there has not been any changes in the patient's overall health status and/or surgical plan to report  Patient has a history of morbid obesity       They present preoperatively for planned: panniculectomy    The following history of N/V post operative: none    Nicotine status: none    Vitals:    08/02/19 1325   BP: 110/70   Pulse: 64   Resp: 12   Temp: 98 5 °F (36 9 °C)     HPI    The following portions of the patient's history were reviewed and updated as appropriate: allergies, current medications, past family history, past medical history, past social history, past surgical history and problem list     Review of Systems   All other systems reviewed and are negative  Objective   Physical Exam   Constitutional  She appears well-developed and well-nourished  Cardiovascular: Normal rate  Pulmonary/Chest  Effort normal      Psychiatric  She has a normal mood and affect  Her behavior is normal        Body mass index is 25 69 kg/m²

## 2019-10-31 ENCOUNTER — OFFICE VISIT (OUTPATIENT)
Dept: PLASTIC SURGERY | Facility: CLINIC | Age: 39
End: 2019-10-31

## 2019-10-31 VITALS — WEIGHT: 133.25 LBS | BODY MASS INDEX: 22.75 KG/M2 | HEIGHT: 64 IN

## 2019-10-31 DIAGNOSIS — Z98.890 STATUS POST PANNICULECTOMY: Primary | ICD-10-CM

## 2019-10-31 PROCEDURE — 99024 POSTOP FOLLOW-UP VISIT: CPT | Performed by: PLASTIC SURGERY

## 2019-10-31 NOTE — PROGRESS NOTES
Steve Serge is 2 months post-op: panniculectomy  Presenting for routine follow-up  S:  Doing well  Patient has noted no pain or discomfort  O:  Abdomen with well healed scar and acceptable contour  Some fullness central flap as expected  A:  Satisfactory course  Patient very happy with results    P: Scar massage and Suncreen protections   Recommended if would improvement of fullness liposuction in the future would help  Patient to return in 10 months  Patient is to return as needed for redness, swelling, discomfort, or any concern about her surgery

## 2020-03-03 DIAGNOSIS — Z20.828 EXPOSURE TO THE FLU: Primary | ICD-10-CM

## 2020-03-03 RX ORDER — OSELTAMIVIR PHOSPHATE 75 MG/1
75 CAPSULE ORAL DAILY
Qty: 10 CAPSULE | Refills: 0 | Status: SHIPPED | OUTPATIENT
Start: 2020-03-03 | End: 2020-03-13

## 2020-03-04 ENCOUNTER — TELEPHONE (OUTPATIENT)
Dept: FAMILY MEDICINE CLINIC | Facility: CLINIC | Age: 40
End: 2020-03-04

## 2020-07-31 ENCOUNTER — OFFICE VISIT (OUTPATIENT)
Dept: FAMILY MEDICINE CLINIC | Facility: CLINIC | Age: 40
End: 2020-07-31
Payer: COMMERCIAL

## 2020-07-31 VITALS
DIASTOLIC BLOOD PRESSURE: 64 MMHG | RESPIRATION RATE: 16 BRPM | HEIGHT: 63 IN | BODY MASS INDEX: 25.87 KG/M2 | TEMPERATURE: 97.7 F | OXYGEN SATURATION: 99 % | WEIGHT: 146 LBS | HEART RATE: 54 BPM | SYSTOLIC BLOOD PRESSURE: 102 MMHG

## 2020-07-31 DIAGNOSIS — Z98.84 STATUS POST BARIATRIC SURGERY: ICD-10-CM

## 2020-07-31 DIAGNOSIS — D50.8 OTHER IRON DEFICIENCY ANEMIA: ICD-10-CM

## 2020-07-31 DIAGNOSIS — B35.4 TINEA CORPORIS: ICD-10-CM

## 2020-07-31 DIAGNOSIS — Z00.01 ENCOUNTER FOR WELL ADULT EXAM WITH ABNORMAL FINDINGS: Primary | ICD-10-CM

## 2020-07-31 PROCEDURE — 99395 PREV VISIT EST AGE 18-39: CPT | Performed by: FAMILY MEDICINE

## 2020-07-31 RX ORDER — CLOTRIMAZOLE 1 %
CREAM (GRAM) TOPICAL 2 TIMES DAILY
Qty: 30 G | Refills: 0 | Status: SHIPPED | OUTPATIENT
Start: 2020-07-31 | End: 2021-09-01 | Stop reason: ALTCHOICE

## 2020-07-31 NOTE — PATIENT INSTRUCTIONS

## 2020-07-31 NOTE — PROGRESS NOTES
Depression Screening and Follow-up Plan: Patient's depression screening was positive with a PHQ-2 score of 0  Clincally patient does not have depression  No treatment is required  FAMILY PRACTICE HEALTH MAINTENANCE OFFICE VISIT  [facility]    NAME: Ihsan White  AGE: 42 y o  SEX: female  : 1980     DATE: 2020    Assessment and Plan     1  Encounter for well adult exam with abnormal findings  Assessment & Plan:  Advice and education were given regarding nutrition, aerobic exercises, weight bearing exercises, cardiovascular risk reduction, fall risk reduction, and age appropriate supplements  The patient was counseled regarding instructions for management, risk factor reductions, prognosis, risks and benefits of treatment options, patient and family education, and importance of compliance with treatment  2  BMI 25 0-25 9,adult  Assessment & Plan:  Chronic status post gastric bypass current the patient portion control low carb and low-fat diet and increased physical activity 30-60 minute a day 5 days a week      3  Tinea corporis  Assessment & Plan:  Asymptomatic in bilateral upper arm recommend to apply Lotrimin cream twice a day for 2 weeks recommend the important keep the area dry    Orders:  -     clotrimazole (LOTRIMIN) 1 % cream; Apply topically 2 (two) times a day    4  Other iron deficiency anemia  -     CBC and differential; Future  -     Basic metabolic panel; Future  -     Ferritin; Future  -     Iron; Future  -     Vitamin B12; Future  -     Vitamin D 25 hydroxy; Future    5  Status post bariatric surgery  -     CBC and differential; Future  -     Basic metabolic panel; Future  -     Ferritin; Future  -     Iron; Future  -     Vitamin B12; Future  -     Vitamin D 25 hydroxy;  Future      · Patient Counseling:   · Nutrition: Stressed importance of a well balanced diet, moderation of sodium/saturated fat, caloric balance and sufficient intake of fiber  · Exercise: Stressed the importance of regular exercise with a goal of 150 minutes per week  · Dental Health: Discussed daily flossing and brushing and regular dental visits     · Immunizations reviewed: Up To Date  · Discussed benefits of:  Cervical Cancer screening   BMI Counseling: Body mass index is 25 66 kg/m²  Discussed with patient's BMI with her       Chief Complaint     Chief Complaint   Patient presents with    Physical Exam     patient is here today for her yearly physical exam     Rash     patient states she keeps getting bilateral arm irritations gets very sore painful and red under her arms        History of Present Illness     Patient here for annual physical exam and she is concerned about rash  Patient deny any chest pain short of breath no palpitation no cough no wheezing no hematosis deny any headache blurred vision no weakness or lateralized the symptom no abdomen pain nausea vomiting or diarrhea no renal problem  no fever no change in the weight and no change in the mood  Patient also concerned about the rash on bilateral upper arm since patient's he had gastric bypass and lost weight she had lose skin and the with the rubbing and a get redness the not itchy and not painful      Well Adult Physical   Patient here for a comprehensive physical exam       Diet and Physical Activity  Diet:  Intermittent Fasting  Exercise: infrequently      Depression Screen  PHQ-9 Depression Screening    PHQ-9:    Frequency of the following problems over the past two weeks:       Little interest or pleasure in doing things:  0 - not at all  Feeling down, depressed, or hopeless:  0 - not at all  PHQ-2 Score:  0          General Health  Hearing: Normal:  bilateral  Vision: no vision problems  Dental: regular dental visits    Reproductive Health  No issues  and Regular Periods      The following portions of the patient's history were reviewed and updated as appropriate: allergies, current medications, past family history, past medical history, past social history, past surgical history and problem list     Review of Systems     Review of Systems   Constitutional: Negative for activity change, appetite change, fatigue and fever  HENT: Negative for congestion, ear pain, sinus pressure, sinus pain and sore throat  Eyes: Negative for pain, discharge, redness and itching  Respiratory: Negative for cough, chest tightness, shortness of breath and stridor  Cardiovascular: Negative for chest pain, palpitations and leg swelling  Gastrointestinal: Negative for abdominal pain, blood in stool, constipation, diarrhea and nausea  Endocrine: Negative for heat intolerance and polydipsia  Genitourinary: Negative for dysuria, flank pain, frequency and hematuria  Musculoskeletal: Negative for back pain, joint swelling and neck pain  Skin: Positive for rash  Negative for pallor  On B/L upper arm   Neurological: Negative for dizziness, tremors, weakness, numbness and headaches  Hematological: Does not bruise/bleed easily  Psychiatric/Behavioral: Negative for agitation and behavioral problems         Past Medical History     Past Medical History:   Diagnosis Date    Anemia     resolved    Tobacco abuse     Vitamin D deficiency        Past Surgical History     Past Surgical History:   Procedure Laterality Date    GASTRIC BYPASS  2008    PANNICULECTOMY N/A 8/19/2019    Procedure: PANNICULECTOMY;  Surgeon: Varun Brown MD;  [facility];  Service: [address] [place] [address]    TUBAL LIGATION  03/2014       Social History     Social History     Socioeconomic History    Marital status: /Civil Union     Spouse name: None    Number of children: None    Years of education: None    Highest education level: None   Occupational History    None   Social Needs    Financial resource strain: Not hard at all   [place] insecurity:     Worry: Never true     Inability: Never true    Transportation needs:     Medical: No     Non-medical: No   Tobacco Use    Smoking status: Former Smoker     Last attempt to quit: 2/13/2017     Years since quitting: 3 4    Smokeless tobacco: Former User    Tobacco comment: quite smoking   Substance and Sexual Activity    Alcohol use: Yes     Frequency: Monthly or less     Drinks per session: 1 or 2     Binge frequency: Never    Drug use: Never    Sexual activity: Yes     Partners: Male     Birth control/protection: Female Sterilization   Lifestyle    Physical activity:     Days per week: 3 days     Minutes per session: 30 min    Stress: Not at all   Relationships    Social connections:     Talks on phone: More than three times a week     Gets together: More than three times a week     Attends Worship service: More than 4 times per year     Active member of club or organization: No     Attends meetings of clubs or organizations: Never     Relationship status:     Intimate partner violence:     Fear of current or ex partner: No     Emotionally abused: No     Physically abused: No     Forced sexual activity: No   Other Topics Concern    None   Social History Narrative    Children: Yes    Hand dominance: right       Family History     Family History   Problem Relation Age of Onset    Hypertension Family     Diabetes Family         Mellitus    Diabetes Mother     Arthritis Mother     Diabetes Father     COPD Father     Asthma Sister     Allergies Sister        Current Medications       Current Outpatient Medications:     clotrimazole (LOTRIMIN) 1 % cream, Apply topically 2 (two) times a day, Disp: 30 g, Rfl: 0     Allergies     No Known Allergies    Objective     /64 (BP Location: Left arm, Patient Position: Sitting, Cuff Size: Adult)   Pulse (!) 54   Temp 97 7 °F (36 5 °C) (Tympanic)   Resp 16   Ht 5' 3 25" (1 607 m)   Wt 66 2 kg (146 lb)   LMP 05/24/2020 (Exact Date)   SpO2 99%   BMI 25 66 kg/m²      Physical Exam   Constitutional: She is oriented to person, place, and time  She appears well-developed and well-nourished  No distress  HENT:   Head: Normocephalic  Right Ear: External ear normal    Left Ear: External ear normal    Eyes: Conjunctivae and EOM are normal  Right eye exhibits no discharge  Left eye exhibits no discharge  Neck: Normal range of motion  Neck supple  No JVD present  Cardiovascular: Normal rate, regular rhythm and normal heart sounds  Exam reveals no gallop  No murmur heard  Pulmonary/Chest: Effort normal and breath sounds normal  No stridor  No respiratory distress  She has no wheezes  She has no rales  She exhibits no tenderness  Abdominal: Soft  She exhibits no distension and no mass  There is no tenderness  There is no rebound  Musculoskeletal: She exhibits no edema or tenderness  Lymphadenopathy:     She has no cervical adenopathy  Neurological: She is alert and oriented to person, place, and time  Skin: Skin is warm  Rash noted  She is not diaphoretic  There is erythema  Psychiatric: She has a normal mood and affect  Her behavior is normal            Sallee Goldmann, MD  [address] [place] [address]  BMI Counseling: Body mass index is 25 66 kg/m²  The BMI is above normal  Nutrition recommendations include reducing portion sizes, 3-5 servings of fruits/vegetables daily and reducing fast food intake  Exercise recommendations include exercising 3-5 times per week

## 2020-07-31 NOTE — ASSESSMENT & PLAN NOTE
Asymptomatic in bilateral upper arm recommend to apply Lotrimin cream twice a day for 2 weeks recommend the important keep the area dry

## 2020-07-31 NOTE — ASSESSMENT & PLAN NOTE
Chronic status post gastric bypass current the patient portion control low carb and low-fat diet and increased physical activity 30-60 minute a day 5 days a week

## 2020-10-08 ENCOUNTER — APPOINTMENT (OUTPATIENT)
Dept: LAB | Facility: CLINIC | Age: 40
End: 2020-10-08
Payer: COMMERCIAL

## 2020-10-08 ENCOUNTER — TRANSCRIBE ORDERS (OUTPATIENT)
Dept: LAB | Facility: CLINIC | Age: 40
End: 2020-10-08

## 2020-10-08 DIAGNOSIS — Z98.84 STATUS POST BARIATRIC SURGERY: ICD-10-CM

## 2020-10-08 DIAGNOSIS — N64.82 HYPOPLASIA OF BREAST: ICD-10-CM

## 2020-10-08 DIAGNOSIS — N64.4 MASTODYNIA: ICD-10-CM

## 2020-10-08 DIAGNOSIS — Z01.818 OTHER SPECIFIED PRE-OPERATIVE EXAMINATION: ICD-10-CM

## 2020-10-08 DIAGNOSIS — Z01.812 PRE-OPERATIVE LABORATORY EXAMINATION: ICD-10-CM

## 2020-10-08 DIAGNOSIS — N64.4 MASTODYNIA: Primary | ICD-10-CM

## 2020-10-08 DIAGNOSIS — E55.9 VITAMIN D DEFICIENCY: Primary | ICD-10-CM

## 2020-10-08 DIAGNOSIS — D50.8 OTHER IRON DEFICIENCY ANEMIA: ICD-10-CM

## 2020-10-08 LAB
25(OH)D3 SERPL-MCNC: 8.9 NG/ML (ref 30–100)
ANION GAP SERPL CALCULATED.3IONS-SCNC: 7 MMOL/L (ref 4–13)
BASOPHILS # BLD AUTO: 0.07 THOUSANDS/ΜL (ref 0–0.1)
BASOPHILS NFR BLD AUTO: 1 % (ref 0–1)
BUN SERPL-MCNC: 15 MG/DL (ref 5–25)
CALCIUM SERPL-MCNC: 8 MG/DL (ref 8.3–10.1)
CHLORIDE SERPL-SCNC: 103 MMOL/L (ref 100–108)
CO2 SERPL-SCNC: 27 MMOL/L (ref 21–32)
CREAT SERPL-MCNC: 1 MG/DL (ref 0.6–1.3)
EOSINOPHIL # BLD AUTO: 0.13 THOUSAND/ΜL (ref 0–0.61)
EOSINOPHIL NFR BLD AUTO: 3 % (ref 0–6)
ERYTHROCYTE [DISTWIDTH] IN BLOOD BY AUTOMATED COUNT: 20.5 % (ref 11.6–15.1)
FERRITIN SERPL-MCNC: 6 NG/ML (ref 8–388)
GFR SERPL CREATININE-BSD FRML MDRD: 72 ML/MIN/1.73SQ M
GLUCOSE P FAST SERPL-MCNC: 55 MG/DL (ref 65–99)
HCT VFR BLD AUTO: 38.8 % (ref 34.8–46.1)
HGB BLD-MCNC: 11.7 G/DL (ref 11.5–15.4)
IMM GRANULOCYTES # BLD AUTO: 0.01 THOUSAND/UL (ref 0–0.2)
IMM GRANULOCYTES NFR BLD AUTO: 0 % (ref 0–2)
IRON SERPL-MCNC: 53 UG/DL (ref 50–170)
LYMPHOCYTES # BLD AUTO: 1.54 THOUSANDS/ΜL (ref 0.6–4.47)
LYMPHOCYTES NFR BLD AUTO: 32 % (ref 14–44)
MCH RBC QN AUTO: 24.4 PG (ref 26.8–34.3)
MCHC RBC AUTO-ENTMCNC: 30.2 G/DL (ref 31.4–37.4)
MCV RBC AUTO: 81 FL (ref 82–98)
MONOCYTES # BLD AUTO: 0.62 THOUSAND/ΜL (ref 0.17–1.22)
MONOCYTES NFR BLD AUTO: 13 % (ref 4–12)
NEUTROPHILS # BLD AUTO: 2.48 THOUSANDS/ΜL (ref 1.85–7.62)
NEUTS SEG NFR BLD AUTO: 51 % (ref 43–75)
NRBC BLD AUTO-RTO: 0 /100 WBCS
PLATELET # BLD AUTO: 352 THOUSANDS/UL (ref 149–390)
PMV BLD AUTO: 10.3 FL (ref 8.9–12.7)
POTASSIUM SERPL-SCNC: 4.4 MMOL/L (ref 3.5–5.3)
RBC # BLD AUTO: 4.8 MILLION/UL (ref 3.81–5.12)
SODIUM SERPL-SCNC: 137 MMOL/L (ref 136–145)
VIT B12 SERPL-MCNC: 2612 PG/ML (ref 100–900)
WBC # BLD AUTO: 4.85 THOUSAND/UL (ref 4.31–10.16)

## 2020-10-08 PROCEDURE — 36415 COLL VENOUS BLD VENIPUNCTURE: CPT

## 2020-10-08 PROCEDURE — 83540 ASSAY OF IRON: CPT

## 2020-10-08 PROCEDURE — 82306 VITAMIN D 25 HYDROXY: CPT

## 2020-10-08 PROCEDURE — 82607 VITAMIN B-12: CPT

## 2020-10-08 PROCEDURE — 85025 COMPLETE CBC W/AUTO DIFF WBC: CPT

## 2020-10-08 PROCEDURE — 80048 BASIC METABOLIC PNL TOTAL CA: CPT

## 2020-10-08 PROCEDURE — 82728 ASSAY OF FERRITIN: CPT

## 2020-10-08 RX ORDER — ERGOCALCIFEROL 1.25 MG/1
50000 CAPSULE ORAL WEEKLY
Qty: 12 CAPSULE | Refills: 0 | Status: SHIPPED | OUTPATIENT
Start: 2020-10-08 | End: 2021-09-01 | Stop reason: ALTCHOICE

## 2020-10-27 RX ORDER — LANOLIN ALCOHOL/MO/W.PET/CERES
1000 CREAM (GRAM) TOPICAL
COMMUNITY

## 2020-10-29 ENCOUNTER — ANESTHESIA EVENT (OUTPATIENT)
Dept: PERIOP | Facility: HOSPITAL | Age: 40
End: 2020-10-29
Payer: COMMERCIAL

## 2020-10-30 ENCOUNTER — ANESTHESIA (OUTPATIENT)
Dept: PERIOP | Facility: HOSPITAL | Age: 40
End: 2020-10-30
Payer: COMMERCIAL

## 2020-10-30 ENCOUNTER — HOSPITAL ENCOUNTER (OUTPATIENT)
Facility: HOSPITAL | Age: 40
Setting detail: OUTPATIENT SURGERY
Discharge: HOME | End: 2020-10-30
Attending: SURGERY | Admitting: SURGERY
Payer: COMMERCIAL

## 2020-10-30 VITALS
RESPIRATION RATE: 16 BRPM | WEIGHT: 145 LBS | SYSTOLIC BLOOD PRESSURE: 128 MMHG | OXYGEN SATURATION: 96 % | HEART RATE: 57 BPM | DIASTOLIC BLOOD PRESSURE: 72 MMHG | BODY MASS INDEX: 25.69 KG/M2 | HEIGHT: 63 IN | TEMPERATURE: 97.1 F

## 2020-10-30 VITALS — HEART RATE: 114 BPM

## 2020-10-30 DIAGNOSIS — T85.44XS CAPSULAR CONTRACTURE OF BREAST IMPLANT, SEQUELA: ICD-10-CM

## 2020-10-30 PROBLEM — N65.0 BREAST RECONSTRUCTION DEFORMITY: Status: ACTIVE | Noted: 2020-10-30

## 2020-10-30 LAB
EXT PREGNANCY TEST URINE: NEGATIVE
EXT. CONTROL: NORMAL

## 2020-10-30 PROCEDURE — 88305 TISSUE EXAM BY PATHOLOGIST: CPT | Performed by: PATHOLOGY

## 2020-10-30 PROCEDURE — C1789 PROSTHESIS, BREAST, IMP: HCPCS | Performed by: SURGERY

## 2020-10-30 PROCEDURE — 88300 SURGICAL PATH GROSS: CPT | Performed by: PATHOLOGY

## 2020-10-30 PROCEDURE — 81025 URINE PREGNANCY TEST: CPT | Performed by: SURGERY

## 2020-10-30 DEVICE — SMOOTH MODERATE PLUS PROFILE XTRA 370CC  SMOOTH ROUND SILICONE
Type: IMPLANTABLE DEVICE | Site: BREAST | Status: FUNCTIONAL
Brand: MENTOR MEMORYGEL XTRA BREAST IMPLANT

## 2020-10-30 RX ORDER — MAGNESIUM HYDROXIDE 1200 MG/15ML
LIQUID ORAL AS NEEDED
Status: DISCONTINUED | OUTPATIENT
Start: 2020-10-30 | End: 2020-10-30 | Stop reason: HOSPADM

## 2020-10-30 RX ORDER — HYDROCODONE BITARTRATE AND ACETAMINOPHEN 5; 325 MG/1; MG/1
1 TABLET ORAL EVERY 4 HOURS PRN
Status: DISCONTINUED | OUTPATIENT
Start: 2020-10-30 | End: 2020-10-30 | Stop reason: HOSPADM

## 2020-10-30 RX ORDER — HYDROCODONE BITARTRATE AND ACETAMINOPHEN 5; 325 MG/1; MG/1
2 TABLET ORAL EVERY 4 HOURS PRN
Status: DISCONTINUED | OUTPATIENT
Start: 2020-10-30 | End: 2020-10-30 | Stop reason: HOSPADM

## 2020-10-30 RX ORDER — ROCURONIUM BROMIDE 10 MG/ML
INJECTION, SOLUTION INTRAVENOUS AS NEEDED
Status: DISCONTINUED | OUTPATIENT
Start: 2020-10-30 | End: 2020-10-30

## 2020-10-30 RX ORDER — BUPIVACAINE HYDROCHLORIDE AND EPINEPHRINE 2.5; 5 MG/ML; UG/ML
INJECTION, SOLUTION EPIDURAL; INFILTRATION; INTRACAUDAL; PERINEURAL AS NEEDED
Status: DISCONTINUED | OUTPATIENT
Start: 2020-10-30 | End: 2020-10-30 | Stop reason: HOSPADM

## 2020-10-30 RX ORDER — PROMETHAZINE HYDROCHLORIDE 25 MG/ML
12.5 INJECTION, SOLUTION INTRAMUSCULAR; INTRAVENOUS ONCE AS NEEDED
Status: DISCONTINUED | OUTPATIENT
Start: 2020-10-30 | End: 2020-10-30 | Stop reason: HOSPADM

## 2020-10-30 RX ORDER — KETOROLAC TROMETHAMINE 30 MG/ML
30 INJECTION, SOLUTION INTRAMUSCULAR; INTRAVENOUS ONCE
Status: DISCONTINUED | OUTPATIENT
Start: 2020-10-30 | End: 2020-10-30 | Stop reason: HOSPADM

## 2020-10-30 RX ORDER — ONDANSETRON 2 MG/ML
INJECTION INTRAMUSCULAR; INTRAVENOUS AS NEEDED
Status: DISCONTINUED | OUTPATIENT
Start: 2020-10-30 | End: 2020-10-30

## 2020-10-30 RX ORDER — DEXAMETHASONE SODIUM PHOSPHATE 4 MG/ML
INJECTION, SOLUTION INTRA-ARTICULAR; INTRALESIONAL; INTRAMUSCULAR; INTRAVENOUS; SOFT TISSUE AS NEEDED
Status: DISCONTINUED | OUTPATIENT
Start: 2020-10-30 | End: 2020-10-30

## 2020-10-30 RX ORDER — SODIUM CHLORIDE, SODIUM LACTATE, POTASSIUM CHLORIDE, CALCIUM CHLORIDE 600; 310; 30; 20 MG/100ML; MG/100ML; MG/100ML; MG/100ML
50 INJECTION, SOLUTION INTRAVENOUS CONTINUOUS
Status: DISCONTINUED | OUTPATIENT
Start: 2020-10-30 | End: 2020-10-30 | Stop reason: HOSPADM

## 2020-10-30 RX ORDER — PROPOFOL 10 MG/ML
INJECTION, EMULSION INTRAVENOUS AS NEEDED
Status: DISCONTINUED | OUTPATIENT
Start: 2020-10-30 | End: 2020-10-30

## 2020-10-30 RX ORDER — MIDAZOLAM HYDROCHLORIDE 2 MG/2ML
INJECTION, SOLUTION INTRAMUSCULAR; INTRAVENOUS AS NEEDED
Status: DISCONTINUED | OUTPATIENT
Start: 2020-10-30 | End: 2020-10-30

## 2020-10-30 RX ORDER — FENTANYL CITRATE 50 UG/ML
INJECTION, SOLUTION INTRAMUSCULAR; INTRAVENOUS AS NEEDED
Status: DISCONTINUED | OUTPATIENT
Start: 2020-10-30 | End: 2020-10-30

## 2020-10-30 RX ORDER — CEFAZOLIN SODIUM 1 G/50ML
1000 SOLUTION INTRAVENOUS ONCE
Status: COMPLETED | OUTPATIENT
Start: 2020-10-30 | End: 2020-10-30

## 2020-10-30 RX ORDER — ONDANSETRON 2 MG/ML
4 INJECTION INTRAMUSCULAR; INTRAVENOUS ONCE AS NEEDED
Status: DISCONTINUED | OUTPATIENT
Start: 2020-10-30 | End: 2020-10-30 | Stop reason: HOSPADM

## 2020-10-30 RX ORDER — HYDROMORPHONE HCL/PF 1 MG/ML
0.5 SYRINGE (ML) INJECTION
Status: DISCONTINUED | OUTPATIENT
Start: 2020-10-30 | End: 2020-10-30 | Stop reason: HOSPADM

## 2020-10-30 RX ORDER — LIDOCAINE HYDROCHLORIDE 10 MG/ML
INJECTION, SOLUTION EPIDURAL; INFILTRATION; INTRACAUDAL; PERINEURAL AS NEEDED
Status: DISCONTINUED | OUTPATIENT
Start: 2020-10-30 | End: 2020-10-30

## 2020-10-30 RX ADMIN — ROCURONIUM BROMIDE 20 MG: 10 INJECTION, SOLUTION INTRAVENOUS at 11:50

## 2020-10-30 RX ADMIN — HYDROMORPHONE HYDROCHLORIDE 0.5 MG: 1 INJECTION, SOLUTION INTRAMUSCULAR; INTRAVENOUS; SUBCUTANEOUS at 13:56

## 2020-10-30 RX ADMIN — HYDROMORPHONE HYDROCHLORIDE 0.5 MG: 1 INJECTION, SOLUTION INTRAMUSCULAR; INTRAVENOUS; SUBCUTANEOUS at 14:07

## 2020-10-30 RX ADMIN — ROCURONIUM BROMIDE 10 MG: 10 INJECTION, SOLUTION INTRAVENOUS at 11:25

## 2020-10-30 RX ADMIN — ONDANSETRON HYDROCHLORIDE 4 MG: 2 INJECTION, SOLUTION INTRAMUSCULAR; INTRAVENOUS at 13:16

## 2020-10-30 RX ADMIN — ROCURONIUM BROMIDE 40 MG: 10 INJECTION, SOLUTION INTRAVENOUS at 10:54

## 2020-10-30 RX ADMIN — ONDANSETRON 4 MG: 2 INJECTION INTRAMUSCULAR; INTRAVENOUS at 14:58

## 2020-10-30 RX ADMIN — FENTANYL CITRATE 50 MCG: 50 INJECTION INTRAMUSCULAR; INTRAVENOUS at 11:17

## 2020-10-30 RX ADMIN — FENTANYL CITRATE 50 MCG: 50 INJECTION INTRAMUSCULAR; INTRAVENOUS at 11:30

## 2020-10-30 RX ADMIN — LIDOCAINE HYDROCHLORIDE 50 MG: 10 INJECTION, SOLUTION EPIDURAL; INFILTRATION; INTRACAUDAL; PERINEURAL at 10:54

## 2020-10-30 RX ADMIN — HYDROMORPHONE HYDROCHLORIDE 0.5 MG: 1 INJECTION, SOLUTION INTRAMUSCULAR; INTRAVENOUS; SUBCUTANEOUS at 13:46

## 2020-10-30 RX ADMIN — DEXAMETHASONE SODIUM PHOSPHATE 4 MG: 4 INJECTION, SOLUTION INTRA-ARTICULAR; INTRALESIONAL; INTRAMUSCULAR; INTRAVENOUS; SOFT TISSUE at 11:06

## 2020-10-30 RX ADMIN — PROPOFOL 150 MG: 10 INJECTION, EMULSION INTRAVENOUS at 10:54

## 2020-10-30 RX ADMIN — HYDROCODONE BITARTRATE AND ACETAMINOPHEN 1 TABLET: 5; 325 TABLET ORAL at 14:58

## 2020-10-30 RX ADMIN — SODIUM CHLORIDE, SODIUM LACTATE, POTASSIUM CHLORIDE, AND CALCIUM CHLORIDE: .6; .31; .03; .02 INJECTION, SOLUTION INTRAVENOUS at 10:18

## 2020-10-30 RX ADMIN — SODIUM CHLORIDE, SODIUM LACTATE, POTASSIUM CHLORIDE, AND CALCIUM CHLORIDE: .6; .31; .03; .02 INJECTION, SOLUTION INTRAVENOUS at 11:29

## 2020-10-30 RX ADMIN — FENTANYL CITRATE 50 MCG: 50 INJECTION INTRAMUSCULAR; INTRAVENOUS at 13:19

## 2020-10-30 RX ADMIN — CEFAZOLIN SODIUM 1000 MG: 1 SOLUTION INTRAVENOUS at 10:40

## 2020-10-30 RX ADMIN — FENTANYL CITRATE 100 MCG: 50 INJECTION INTRAMUSCULAR; INTRAVENOUS at 10:48

## 2020-10-30 RX ADMIN — MIDAZOLAM HYDROCHLORIDE 2 MG: 1 INJECTION, SOLUTION INTRAMUSCULAR; INTRAVENOUS at 10:49

## 2020-12-29 ENCOUNTER — IMMUNIZATIONS (OUTPATIENT)
Dept: FAMILY MEDICINE CLINIC | Facility: HOSPITAL | Age: 40
End: 2020-12-29
Payer: COMMERCIAL

## 2020-12-29 DIAGNOSIS — Z23 ENCOUNTER FOR IMMUNIZATION: ICD-10-CM

## 2020-12-29 PROCEDURE — 91301 SARS-COV-2 / COVID-19 MRNA VACCINE (MODERNA) 100 MCG: CPT

## 2020-12-29 PROCEDURE — 0011A SARS-COV-2 / COVID-19 MRNA VACCINE (MODERNA) 100 MCG: CPT

## 2021-01-28 ENCOUNTER — IMMUNIZATIONS (OUTPATIENT)
Dept: FAMILY MEDICINE CLINIC | Facility: HOSPITAL | Age: 41
End: 2021-01-28

## 2021-01-28 DIAGNOSIS — Z23 ENCOUNTER FOR IMMUNIZATION: Primary | ICD-10-CM

## 2021-01-28 PROCEDURE — 91301 SARS-COV-2 / COVID-19 MRNA VACCINE (MODERNA) 100 MCG: CPT

## 2021-01-28 PROCEDURE — 0012A SARS-COV-2 / COVID-19 MRNA VACCINE (MODERNA) 100 MCG: CPT

## 2021-08-31 ENCOUNTER — TELEPHONE (OUTPATIENT)
Dept: FAMILY MEDICINE CLINIC | Facility: CLINIC | Age: 41
End: 2021-08-31

## 2021-08-31 DIAGNOSIS — Z13.220 SCREENING FOR LIPID DISORDERS: ICD-10-CM

## 2021-08-31 DIAGNOSIS — Z13.1 SCREENING FOR DIABETES MELLITUS: ICD-10-CM

## 2021-08-31 DIAGNOSIS — Z98.84 STATUS POST BARIATRIC SURGERY: Primary | ICD-10-CM

## 2021-08-31 NOTE — TELEPHONE ENCOUNTER
Lipid and HA1C for Wellness program   Also patient has HX of Gastric bypass I recommend to check CBC Iron Ferritin and B12 level

## 2021-09-01 ENCOUNTER — OFFICE VISIT (OUTPATIENT)
Dept: FAMILY MEDICINE CLINIC | Facility: CLINIC | Age: 41
End: 2021-09-01
Payer: COMMERCIAL

## 2021-09-01 VITALS
HEIGHT: 62 IN | OXYGEN SATURATION: 100 % | SYSTOLIC BLOOD PRESSURE: 110 MMHG | BODY MASS INDEX: 26.91 KG/M2 | HEART RATE: 60 BPM | DIASTOLIC BLOOD PRESSURE: 80 MMHG | WEIGHT: 146.2 LBS | TEMPERATURE: 98.3 F

## 2021-09-01 DIAGNOSIS — Z98.84 STATUS POST BARIATRIC SURGERY: ICD-10-CM

## 2021-09-01 DIAGNOSIS — E66.3 OVERWEIGHT WITH BODY MASS INDEX (BMI) OF 26 TO 26.9 IN ADULT: ICD-10-CM

## 2021-09-01 DIAGNOSIS — Z00.01 ENCOUNTER FOR WELL ADULT EXAM WITH ABNORMAL FINDINGS: Primary | ICD-10-CM

## 2021-09-01 PROBLEM — B35.4 TINEA CORPORIS: Status: RESOLVED | Noted: 2019-05-08 | Resolved: 2021-09-01

## 2021-09-01 PROBLEM — L30.4 INTERTRIGO: Status: RESOLVED | Noted: 2019-06-13 | Resolved: 2021-09-01

## 2021-09-01 PROCEDURE — 99395 PREV VISIT EST AGE 18-39: CPT | Performed by: FAMILY MEDICINE

## 2021-09-01 NOTE — PROGRESS NOTES
ADULT ANNUAL [address] [facility] PRIMARY CARE [facility]    NAME: Shelby Silva  AGE: 47 y o  SEX: female  : 1980     DATE: 2021     Assessment and Plan:     Problem List Items Addressed This Visit        Other    Status post bariatric surgery    Relevant Orders    Vitamin D 25 hydroxy    Encounter for well adult exam with abnormal findings - Primary      Advice and education were given regarding nutrition, aerobic exercises, weight bearing exercises, cardiovascular risk reduction, fall risk reduction, and age appropriate supplements  The patient was counseled regarding instructions for management, risk factor reductions, prognosis, risks and benefits of treatment options, patient and family education, and importance of compliance with treatment  Overweight with body mass index (BMI) of 26 to 26 9 in adult     The BMI is above average  BMI counseling and education was provided to the patient  Nutrition recommendations include reducing portion sizes, decreasing overall calorie intake, 3-5 servings of fruits/vegetables daily, reducing fast food intake, consuming healthier snacks, decreasing soda and/or juice intake, moderation in carbohydrate intake and reducing intake of saturated fat and trans fat  Exercise recommendations include moderate aerobic physical activity for 150 minutes/week, exercising 3-5 times per week and joining a gym  Immunizations and preventive care screenings were discussed with patient today  Appropriate education was printed on patient's after visit summary  Counseling:  Alcohol/drug use: discussed moderation in alcohol intake, the recommendations for healthy alcohol use, and avoidance of illicit drug use  Dental Health: discussed importance of regular tooth brushing, flossing, and dental visits    Injury prevention: discussed safety/seat belts, safety helmets, smoke detectors, carbon dioxide detectors, and smoking near bedding or upholstery  Sexual health: discussed sexually transmitted diseases, partner selection, use of condoms, avoidance of unintended pregnancy, and contraceptive alternatives  · Exercise: the importance of regular exercise/physical activity was discussed  Recommend exercise 3-5 times per week for at least 30 minutes  BMI Counseling: Body mass index is 26 56 kg/m²  The BMI is above normal  Nutrition recommendations include decreasing portion sizes, decreasing fast food intake and limiting drinks that contain sugar  Exercise recommendations include exercising 3-5 times per week  No pharmacotherapy was ordered  No follow-ups on file  Chief Complaint:     Chief Complaint   Patient presents with    Annual Exam      History of Present Illness:     Adult Annual Physical   Patient here for a comprehensive physical exam  The patient reports no problems  Diet and Physical Activity  · Diet/Nutrition: well balanced diet  · Exercise: 3-4 times a week on average  Depression Screening  PHQ-9 Depression Screening    PHQ-9:   Frequency of the following problems over the past two weeks:      Little interest or pleasure in doing things: 0 - not at all  Feeling down, depressed, or hopeless: 0 - not at all  PHQ-2 Score: 0       General Health  · Sleep: gets 4-6 hours of sleep on average  · Hearing: normal - bilateral   · Vision: no vision problems  · Dental: regular dental visits  /GYN Health  · Last menstrual period: 07/30/21  · Contraceptive method: Tubal ligation  · History of STDs?: no      Review of Systems:     Review of Systems   Constitutional: Negative for activity change, appetite change, fatigue and fever  HENT: Negative for congestion, ear pain, sinus pressure, sinus pain and sore throat  Eyes: Negative for pain, discharge, redness and itching  Respiratory: Negative for cough, chest tightness, shortness of breath and stridor  Cardiovascular: Negative for chest pain, palpitations and leg swelling  Gastrointestinal: Negative for abdominal pain, blood in stool, constipation, diarrhea and nausea  Endocrine: Negative for heat intolerance and polydipsia  Genitourinary: Negative for dysuria, flank pain, frequency and hematuria  Musculoskeletal: Negative for back pain, joint swelling and neck pain  Skin: Negative for pallor and rash  Neurological: Negative for dizziness, tremors, weakness, numbness and headaches  Hematological: Does not bruise/bleed easily  Psychiatric/Behavioral: Negative for agitation and behavioral problems        Past Medical History:     Past Medical History:   Diagnosis Date    Anemia     vit B12 def    History of transfusion     iron infusions    Intertrigo 6/13/2019    Tinea corporis 5/8/2019    Tobacco abuse     Vitamin D deficiency       Past Surgical History:     Past Surgical History:   Procedure Laterality Date    GASTRIC BYPASS  2008    PANNICULECTOMY N/A 8/19/2019    Procedure: PANNICULECTOMY;  Surgeon: Cheryle Parry MD;  [facility];  Service: Plastics    IL BREAST AUGMENTATION WITH IMPLANT Left 10/30/2020    Procedure: IMPLANT EXCHANGE;  Surgeon: Alexey Miller MD;  Location: [address] [place] [address] OR;  Service: Plastics    IL ISAI-IMPLANT CAPSULECTOMY BREAST COMPLETE Left 10/30/2020    Procedure: CAPSULECTOMY;  Surgeon: Alexey Miller MD;  Location: [address] [place] [address] OR;  Service: Plastics    IL SUSPENSION OF BREAST Right 10/30/2020    Procedure: MASTOPEXY BREAST WITH AUGMENTATION;  Surgeon: Alexey Miller MD;  Location: [address] [place] [address] OR;  Service: Plastics    REDUCTION MAMMAPLASTY  1994    TUBAL LIGATION        Social History:     Social History     Socioeconomic History    Marital status: /Civil Union     Spouse name: None    Number of children: None    Years of education: None    Highest education level: None   Occupational History    None   Tobacco Use    Smoking status: Former Smoker     Quit date: 2017     Years since quittin 5    Smokeless tobacco: Former User    Tobacco comment: quite smoking   Vaping Use    Vaping Use: Never used   Substance and Sexual Activity    Alcohol use: Yes    Drug use: Never    Sexual activity: Yes     Partners: Male     Birth control/protection: Female Sterilization   Other Topics Concern    None   Social History Narrative    Children: Yes    Hand dominance: right     Social Determinants of Health     Financial Resource Strain:     Difficulty of Paying Living Expenses:    Food Insecurity:     Worried About Running Out of Food in the Last Year:     [address] in the Last Year:    Transportation Needs:     Lack of Transportation (Medical):  Lack of Transportation (Non-Medical):    Physical Activity:     Days of Exercise per Week:     Minutes of Exercise per Session:    Stress:     Feeling of Stress :    Social Connections:     Frequency of Communication with Friends and Family:     Frequency of Social Gatherings with Friends and Family:     Attends Mandaen Services:     Active Member of Clubs or Organizations:     Attends Club or Organization Meetings:     Marital Status:    Intimate Partner Violence:     Fear of Current or Ex-Partner:     Emotionally Abused:     Physically Abused:     Sexually Abused:       Family History:     Family History   Problem Relation Age of Onset    Hypertension Family     Diabetes Family         Mellitus    Diabetes Mother     Arthritis Mother     Diabetes Father     COPD Father     Asthma Sister     Allergies Sister       Current Medications:     Current Outpatient Medications   Medication Sig Dispense Refill    vitamin B-12 (VITAMIN B-12) 1,000 mcg tablet 1,000 mcg every 30 (thirty) days Injection monthly       No current facility-administered medications for this visit        Allergies:     No Known Allergies   Physical Exam:     /80   Pulse 60   Temp 98 3 °F (36 8 °C) (Tympanic)   Ht 5' 2 21" (1 58 m)   Wt 66 3 kg (146 lb 3 2 oz)   LMP 07/30/2021 (Exact Date)   SpO2 100%   BMI 26 56 kg/m²     Physical Exam  Vitals and nursing note reviewed  Constitutional:       General: She is not in acute distress  Appearance: She is well-developed and normal weight  She is not toxic-appearing or diaphoretic  HENT:      Head: Normocephalic and atraumatic  Right Ear: Tympanic membrane, ear canal and external ear normal  There is no impacted cerumen  Left Ear: Tympanic membrane, ear canal and external ear normal  There is no impacted cerumen  Nose: Nose normal  No congestion or rhinorrhea  Mouth/Throat:      Mouth: Mucous membranes are moist       Pharynx: Oropharynx is clear  No oropharyngeal exudate or posterior oropharyngeal erythema  Eyes:      General: No scleral icterus  Right eye: No discharge  Left eye: No discharge  Conjunctiva/sclera: Conjunctivae normal       Pupils: Pupils are equal, round, and reactive to light  Neck:      Thyroid: No thyromegaly  Cardiovascular:      Rate and Rhythm: Normal rate and regular rhythm  Pulses: Normal pulses  Heart sounds: Normal heart sounds  No murmur heard  No friction rub  Pulmonary:      Effort: Pulmonary effort is normal  No respiratory distress  Breath sounds: Normal breath sounds  No wheezing or rales  Chest:      Chest wall: No tenderness  Abdominal:      General: There is no distension  Palpations: Abdomen is soft  There is no mass  Tenderness: There is no abdominal tenderness  Hernia: No hernia is present  There is no hernia in the left inguinal area  Genitourinary:     Labia:         Right: No rash  Left: No rash  Vagina: No foreign body  No vaginal discharge, tenderness or bleeding  Cervix: No cervical motion tenderness  Adnexa:         Right: No mass or tenderness  Left: No mass or tenderness  Musculoskeletal:         General: Normal range of motion  Cervical back: Normal range of motion  No rigidity  Lymphadenopathy:      Cervical: No cervical adenopathy  Skin:     General: Skin is warm  Coloration: Skin is not pale  Findings: No erythema or rash  Neurological:      Mental Status: She is alert and oriented to person, place, and time  Sensory: No sensory deficit  Motor: No weakness or abnormal muscle tone        Gait: Gait normal       Deep Tendon Reflexes: Reflexes normal    Psychiatric:         Mood and Affect: Mood normal          Behavior: Behavior normal           Siddhartha Wilde MD   [address] [place] [address]

## 2021-09-01 NOTE — PATIENT INSTRUCTIONS

## 2021-09-02 ENCOUNTER — APPOINTMENT (OUTPATIENT)
Dept: LAB | Facility: CLINIC | Age: 41
End: 2021-09-02
Payer: COMMERCIAL

## 2021-09-02 DIAGNOSIS — Z98.84 STATUS POST BARIATRIC SURGERY: ICD-10-CM

## 2021-09-02 DIAGNOSIS — Z13.220 SCREENING FOR LIPID DISORDERS: ICD-10-CM

## 2021-09-02 DIAGNOSIS — Z00.8 HEALTH EXAMINATION IN POPULATION SURVEY: ICD-10-CM

## 2021-09-02 DIAGNOSIS — Z13.1 SCREENING FOR DIABETES MELLITUS: ICD-10-CM

## 2021-09-02 LAB
25(OH)D3 SERPL-MCNC: 14.7 NG/ML (ref 30–100)
BASOPHILS # BLD AUTO: 0.06 THOUSANDS/ΜL (ref 0–0.1)
BASOPHILS NFR BLD AUTO: 1 % (ref 0–1)
CHOLEST SERPL-MCNC: 173 MG/DL (ref 50–200)
EOSINOPHIL # BLD AUTO: 0.09 THOUSAND/ΜL (ref 0–0.61)
EOSINOPHIL NFR BLD AUTO: 1 % (ref 0–6)
ERYTHROCYTE [DISTWIDTH] IN BLOOD BY AUTOMATED COUNT: 20 % (ref 11.6–15.1)
EST. AVERAGE GLUCOSE BLD GHB EST-MCNC: 111 MG/DL
FERRITIN SERPL-MCNC: 5 NG/ML (ref 8–388)
HBA1C MFR BLD: 5.5 %
HCT VFR BLD AUTO: 35.4 % (ref 34.8–46.1)
HDLC SERPL-MCNC: 103 MG/DL
HGB BLD-MCNC: 10.8 G/DL (ref 11.5–15.4)
IMM GRANULOCYTES # BLD AUTO: 0.02 THOUSAND/UL (ref 0–0.2)
IMM GRANULOCYTES NFR BLD AUTO: 0 % (ref 0–2)
IRON SERPL-MCNC: 31 UG/DL (ref 50–170)
LDLC SERPL CALC-MCNC: 58 MG/DL (ref 0–100)
LYMPHOCYTES # BLD AUTO: 2.23 THOUSANDS/ΜL (ref 0.6–4.47)
LYMPHOCYTES NFR BLD AUTO: 29 % (ref 14–44)
MCH RBC QN AUTO: 23.7 PG (ref 26.8–34.3)
MCHC RBC AUTO-ENTMCNC: 30.5 G/DL (ref 31.4–37.4)
MCV RBC AUTO: 78 FL (ref 82–98)
MONOCYTES # BLD AUTO: 0.7 THOUSAND/ΜL (ref 0.17–1.22)
MONOCYTES NFR BLD AUTO: 9 % (ref 4–12)
NEUTROPHILS # BLD AUTO: 4.6 THOUSANDS/ΜL (ref 1.85–7.62)
NEUTS SEG NFR BLD AUTO: 60 % (ref 43–75)
NONHDLC SERPL-MCNC: 70 MG/DL
NRBC BLD AUTO-RTO: 0 /100 WBCS
PLATELET # BLD AUTO: 411 THOUSANDS/UL (ref 149–390)
PMV BLD AUTO: 9.7 FL (ref 8.9–12.7)
RBC # BLD AUTO: 4.55 MILLION/UL (ref 3.81–5.12)
TRIGL SERPL-MCNC: 61 MG/DL
VIT B12 SERPL-MCNC: 483 PG/ML (ref 100–900)
WBC # BLD AUTO: 7.7 THOUSAND/UL (ref 4.31–10.16)

## 2021-09-02 PROCEDURE — 82306 VITAMIN D 25 HYDROXY: CPT

## 2021-09-02 PROCEDURE — 36415 COLL VENOUS BLD VENIPUNCTURE: CPT

## 2021-09-02 PROCEDURE — 83036 HEMOGLOBIN GLYCOSYLATED A1C: CPT

## 2021-09-02 PROCEDURE — 83540 ASSAY OF IRON: CPT

## 2021-09-02 PROCEDURE — 82728 ASSAY OF FERRITIN: CPT

## 2021-09-02 PROCEDURE — 80061 LIPID PANEL: CPT

## 2021-09-02 PROCEDURE — 82607 VITAMIN B-12: CPT

## 2021-09-02 PROCEDURE — 85025 COMPLETE CBC W/AUTO DIFF WBC: CPT

## 2021-09-03 ENCOUNTER — TELEPHONE (OUTPATIENT)
Dept: HEMATOLOGY ONCOLOGY | Facility: CLINIC | Age: 41
End: 2021-09-03

## 2021-09-03 ENCOUNTER — TELEPHONE (OUTPATIENT)
Dept: FAMILY MEDICINE CLINIC | Facility: CLINIC | Age: 41
End: 2021-09-03

## 2021-09-03 DIAGNOSIS — E55.9 VITAMIN D DEFICIENCY: Primary | ICD-10-CM

## 2021-09-03 DIAGNOSIS — D50.8 OTHER IRON DEFICIENCY ANEMIA: Primary | ICD-10-CM

## 2021-09-03 RX ORDER — ERGOCALCIFEROL 1.25 MG/1
50000 CAPSULE ORAL WEEKLY
Qty: 12 CAPSULE | Refills: 0 | Status: SHIPPED | OUTPATIENT
Start: 2021-09-03 | End: 2022-07-29 | Stop reason: SDUPTHER

## 2021-09-03 NOTE — TELEPHONE ENCOUNTER
----- Message from King Perez MD sent at 9/3/2021  8:12 AM EDT -----  To start Vit D 16777 iu/w for 12 week   Then every other week  I recommend  to call Dr Jim Lopez for iron infusion

## 2021-09-03 NOTE — TELEPHONE ENCOUNTER
Per dr Jeremy Pineda sent script for vitamin d 54557 once a week for 12 weeks   Also referred to dr Lemuel Santiago

## 2021-09-03 NOTE — TELEPHONE ENCOUNTER
New Patient Encounter    New Patient Intake Form   Patient Details:  Genoveva Dubin  1980  [id number]    Background Information:  [address] starts by opening a telephone encounter and gathering the following information   Who is calling to schedule? If not self, relationship to patient? Patient   Referring Provider Dr Marcel Morris   What is the diagnosis? Other iron deficiency anemia   Is this Cancer or Non-Cancer? Non-Cancer   Is this diagnosis confirmed? Yes   When was the diagnosis? 2010   Is there a confirmed diagnosis from a biopsy/tissue reviewed by pathology? No   Were outside slides requested? No   Is patient aware of diagnosis? Yes   Is there a personal history and what kind? Yes - iron deficiency anemia seen by Dr Ken Patton (Last office visit 2017)   Is there a family history and what kind? No   Reason for visit? History Of   Have you had any imaging or labs done? If so: when, where? yes  09/02 at [address] records in [facility]? yes   Are records needed form an outside facility? No   If yes, name, city, state where facility is located  If patient has a prior history of cancer were old records obtained? No   Was the patient told to bring a disk? No   Does the patient smoke or Vape? No   If yes, how many packs or cartridges per day? Scheduling Information:   Preferred [place]:  [place]     Are there any dates/time the patient cannot be seen?     Miscellaneous:

## 2021-09-11 NOTE — PROGRESS NOTES
Hematology Outpatient Office Note    Date of Service: 9/16/2021    [facility] HEMATOLOGY SPECIALISTS [facility]  [phone]    Reason for Consultation:   Chief Complaint   Patient presents with    Consult       Referral Physician: Gypsy Calhoun MD    Primary Care Physician:  Greg Ortiz MD     Nickname: Jeffrey Koroma        ASSESSMENT & PLAN      Diagnosis ICD-10-CM Associated Orders   1  Other iron deficiency anemia  D50 8 Ambulatory referral to Hematology / Oncology   2  Status post bariatric surgery  Z98 84          This is a 47 y o  c PMHx notable for  Remote morbid obesity status post gastric bypass, post gastric bypass iron deficiency anemia, being seen in consultation for  Consideration of IV iron     Iron Deficiency anemia  Low iron levels can cause anemia (low red blood cells)  Low iron levels can also make people feel poorly, even before anemia starts  Iron is used to make red blood cells  If blood is lost from the body, if iron can't be absorbed from food, or if something removes iron (pregnancy) the iron can be low and then anemia happens  Iron deficiency is very common  Low iron can cause fatigue or tiredness, the desire to eat ice or non-food items like corn starch or dry pasta noodles, restless legs, weak fingernails, cracks at the corner of the mouth  Common causes of iron deficiency include inadequate diet (uncommon, usually vegetarians), gastric bypass surgery (very common), pregnancy (very common), periods (most common cause in younger women), blood loss (usually from the stomach or intestines), and decreased iron absorption from the intestines from gastritis, H pylori, acid blocking medicines or celiac disease  Even a normal menstrual period can cause iron deficiency  In 10% of patients a clear cause of low iron is not found  14% of women will have iron deficiency just from their menstrual period       Celiac disease is a wheat allergy  H pylori is a bacteria that can live in the lining of the stomach, it may cause ulcers or just iron deficiency without ulcers  Gastritis is inflammation of the stomach lining, it can be from the immune system  If these conditions are present, iron pills are less likely to be effective  These issues can be treated, and it can help improve the low iron levels  Celiac disease is treated by avoiding wheat products  H pylori can be treated by your primary care doctor with antibiotics  If we find no good reason for low iron, we recommend an upper endoscopy and colonoscopy (camera study of the stomach, intestines, colon)  Colonoscopy is routine for people age 39 or greater  These are recommended since we may find a source of blood loss that can change what the treatment would be  Iron deficiency is treated with pills as a first step  For some people the iron pills do not work, cause severe side effects, or take too long to work: for these people IV iron can be given  You may only require IV iron very rarely, for example only when pregnant, or from 1-2 times a year based on your rate of need  It takes 4 weeks for IV iron to improve the anemia to maximum potential  If your fatigue is not from the low iron, getting iron treatment would not help the fatigue     -Patients or other doctors will commonly message about blood work "iron" results and interpret this information incorrectly  The "Iron" test you see in [email or web address] is not a good way to see if your body has adequate iron; this number changes with the time of day, with what you eat, etc  It is not used to determine if you have iron deficiency  The better tests that show up in [employer] are Ferritin, Hemoglobin, Iron Binding Capacity, Reticulocyte Hemoglobin  -IV iron: side effects can include nausea, joint pains, diarrhea, vomiting, pain of abdomen or chest, low blood pressure, shortness of breath  The chance of serious reactions is <1/1000, but around 5-10% will have some degree of the problems above  If a side effect happens, the nurses stop the infusion, wait 20 minutes, and then restart the infusion at a reduced rate  If a severe reaction happens, we might need to change the type of iron that we use  There are several types of IV iron  The best type for you is determined by your insurance coverage, your time availability, and cost concerns  Every medicine has risks, every medicine has benefits  Benefits: IV iron can replace iron in the body quickly  This enables people to have more energy, decrease fatigue, improve restless legs, reduce non-food cravings, and can avoid the need for blood transfusion in the future  Blood transfusion can be needed for severe iron deficiency, or if other blood loss occurs (as happens with childbirth or surgery)  Risks: People can have reactions to IV iron  There is no good way to predict who will have a reaction or how severe the reaction will be  The vast majority of reactions are mild, but life-threatening or fatal reactions can occur (such as drug hypersensitivity or anaphylaxis)  Just like how any pill medicine, injection medicine, or an insect bite/sting can cause a severe allergy, IV iron can do the same  This is why IV iron is given in a special infusion center with emergency medicines in case they are needed  Comparing the risks of iron types:  Feraheme versus Venofer: from a randomized trial of 605 patients, there was no difference between the iron types in terms of overall reactions from the medicine  (Ashley W  Manuel Sullivan, Am J Hematology 2014)  -Side effects from the medicine were found in 14% of those given Feraheme and 16% of those given Venofer  These included abnormal taste, abnormal smell, pain, flushing, headache, diarrhea, constipation   -Serious side effects were found in 0 5% given Feraheme and 0% given Venofer  These included anaphylaxis, hypertension, hives  There were no deaths from IV iron      Specific numbers on risks: Because serious reactions can occur with IV iron and are so rare, it is difficult to determine their absolute number with IV iron  Based on reports of adverse reactions, Injectafer appears to have the lowest risk of serious hypersensitivity reactions or anaphylaxis  Next lowest would be Venofer, followed by Feraheme and Low Molecular Weight Iron Dextran ([place], Drug Safety 2021)  Anaphylaxis can occur with antibiotics such as penicillin (10-50/100,000), and can be fatal (in 1-2/100,000)  Risk of anaphylaxis with modern iron types ranges from 11/100,000 with iron sucrose to 25-70/100,000 with Feraheme or low molecular weight iron dextran, risk of death with IV iron ranges from 0 81/100,000 with low molecular weight iron dextran, 3 5/100,000 for Feraheme, to 6/100,000 with Venofer; however these values overlap statistically (Gaelery Am J Hematology, 2015)  Another study found life-threatening reactions in 0 6/million with Venofer, and 3 3/million with iron dextran ([facility], 2006)  Blood transfusion also has risks: fever occurs in 1%, serious transfusion reactions occur in 8 1/100,000, life-threatening transfusion reactions occur in 1/140,000 units of blood given, Hepatitis B in 1/282,000, and Hepatitis C  in 1/1,150,000  [facility] Inter Med 2012)  From this IV iron is safer than driving a car or getting a blood transfusion, and we take precautions to make the risk as low as possible, but no medicine has zero risk  Infusion iron instructions to patients and nurses    Iron reactions such as rash, flushing, nausea, chest tightness, chest pain, diarrhea are common with IV iron  These are not prevented by premedication, and are different from other infusion reactions  The approach is generally not to use benadryl  In the event of an iron infusion reaction:   1  STOP and disconnect the infusion, assess symptoms, obtain vital signs    2  If a severe/anaphylactic reaction with life-threatening involvement of the airway and/or cardiovascular system is occurring, ensure that a Code Blue is called  a  Lay patient flat  Administer fluid bolus if hypotensive  Administer epinephrine and albuterol for hypoxia or wheezing  Administer hydrocortisone for severe wheezing, stridor, hypoxia, hypotension  Administer ranitidine for hypotension, bronchospasm, stridor  If not improved, consider hospital admission  Restarting infusion is not recommended  Alternative formulation consideration is recommended  3  For minor reactions (rash, flushing, chest tightness, chest pain, back pain, muscle pain, hives, nausea, diarrhea, abdominal pain), please notify the physician  a  Wait until improvement of symptoms (20 minute minimum), resume infusion at 50% the initial rate  If there is not sufficient time in the day to complete infusion at the reduced rate, the infusion will have to be cancelled  Cetirizine can be administered for: mild hives, rash, flushing, itching, pain, nausea, diarrhea, and headache  Famotidine can be administered for: moderate or severe hives, rash, flushing, itching, pain, nausea, diarrhea, headache  Tylenol can be administered for headache  Compazine can be administered for nausea, or second line for headache  4  Monitor vital signs, including O2 saturation, every 2 minutes until stable during iron reaction, then every 15 minutes until reaction has resolved  5  Administer oxygen via nasal cannula at 2-6 liters per minute to keep O2 sats > 92%, if needed  6  If patient develops recurrent symptoms at reduced rate of infusion, please contact the physician  For minor reactions, or incomplete resolution of symptoms (headache, rash, etc): infusion can proceed however patient may benefit from a change in iron formulation          · Discussion of decision making    I personally reviewed the following lab results, the image studies, pathology, other specialty/physicians consult notes and recommendations, and outside medical records from [facility]  I had a lengthy discussion with the patient and shared the work-up findings  We discussed the diagnosis and management plan as below  I spent 47 minutes reviewing the records (labs, clinician notes, outside records, medical history, ordering medicine/tests/procedures, interpreting the imaging/labs previously done) and coordination of care as well as direct time with the patient today, of which greater than 50% of the time was spent in counseling and coordination of care with the patient/family  · Plan/Labs  ·  IV Venofer supportive care plan to be initiated in given in a frequency of once weekly  ·  Due to a calculated iron deficit of 691 mg per Ganzoni method, will plan for 4 sessions and reassess iron stores in 3 months (from last IV iron infusion) time with CBC and iron profile        Follow Up: 4 months with repeat labs as per above    All questions were answered to the patient's satisfaction during this encounter  The patient knows the contact information for our office and knows to reach out for any relevant concerns related to this encounter  They are to call for any temperature 100 4 or higher, new symptoms including but not restricted to shaking chills, decreased appetite, nausea, vomiting, diarrhea, increased fatigue, shortness of breath or chest pain, confusion, and not feeling the strength to come to the clinic  For all other listed problems and medical diagnosis in their chart - they are managed by PCP and/or other specialists, which the patient acknowledges  Thank you very much for your consultation and making us a part of this patient's care  We are continuing to follow closely with you  Please do not hesitate to reach out to me with any additional questions or concerns  Davey Moore MD  Hematology & Medical Oncology Staff Physician             Disclaimer:  This document was prepared using [employer] Mod[employer] Fluency Direct technology  If a word or phrase is confusing, or does not make sense, this is likely due to recognition error which was not discovered during this clinician's review  If you believe an error has occurred, please contact me through [address] [facility] line for celeste? 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   New medications in the last month: Accolate      2008 was her gastric bypass  She required once weekly IV iron when she was pregnant in 2013  No IV iron since that time  She got reconstructive cosmetic breast surgery 2020  SUBJECTIVE  (INTERVAL HISTORY)        I have reviewed the relevant past medical, surgical, social and family history  I have also reviewed allergies and medications for this patient  Review of Systems    She started feeling fatigued since 12/2021 along with low energy level, AM LH/HA, and pale complexion  Denies CP, SOB, unintentional weight loss, F/C, N/V  A 10-point review of system was performed, pertinent positive and negative were detailed as above  Otherwise, the 10-point review of system was negative        Past Medical History:   Diagnosis Date    Anemia     vit B12 def    History of transfusion     iron infusions    Intertrigo 6/13/2019    Tinea corporis 5/8/2019    Tobacco abuse     Vitamin D deficiency        Past Surgical History:   Procedure Laterality Date    GASTRIC BYPASS  2008    PANNICULECTOMY N/A 8/19/2019    Procedure: PANNICULECTOMY;  Surgeon: Em Malcolm MD;  [facility];  Service: Plastics    MS BREAST AUGMENTATION WITH IMPLANT Left 10/30/2020 Procedure: IMPLANT EXCHANGE;  Surgeon: Didi Daiz MD;  Location: [address] [place] [address] OR;  Service: Plastics    CT ISAI-IMPLANT CAPSULECTOMY BREAST COMPLETE Left 10/30/2020    Procedure: CAPSULECTOMY;  Surgeon: Didi Diaz MD;  Location: [address] [place] [address] MAIN OR;  Service: Plastics    CT SUSPENSION OF BREAST Right 10/30/2020    Procedure: MASTOPEXY BREAST WITH AUGMENTATION;  Surgeon: Didi Diaz MD;  Location:  MAIN OR;  Service: Plastics   [place]    TUBAL LIGATION         Family History   Problem Relation Age of Onset    Hypertension Family     Diabetes Family         Mellitus    Diabetes Mother    [facility] Arthritis Mother     Diabetes Father     COPD Father     Asthma Sister     Allergies Sister        Social History     Socioeconomic History    Marital status: /Civil Union     Spouse name: Not on file    Number of children: Not on file    Years of education: Not on file    Highest education level: Not on file   Occupational History    Not on file   Tobacco Use    Smoking status: Former Smoker     Quit date: 2017     Years since quittin 5    Smokeless tobacco: Former User    Tobacco comment: quite smoking   Vaping Use    Vaping Use: Never used   Substance and Sexual Activity    Alcohol use: Yes    Drug use: Never    Sexual activity: Yes     Partners: Male     Birth control/protection: Female Sterilization   Other Topics Concern    Not on file   Social History Narrative    Children: Yes    Hand dominance: right     Social Determinants of Health     Financial Resource Strain:     Difficulty of Paying Living Expenses:    Food Insecurity:     Worried About Running Out of Food in the Last Year:     [address] in the Last Year:    Transportation Needs:     Lack of Transportation (Medical):      Lack of Transportation (Non-Medical):    Physical Activity:     Days of Exercise per Week:     Minutes of Exercise per Session:    Stress:     Feeling of Stress :    Social Connections:     Frequency of Communication with Friends and Family:     Frequency of Social Gatherings with Friends and Family:     Attends Cheondo[employer] Services:     Active Member of Clubs or Organizations:     Attends Club or Organization Meetings:     Marital Status:    Intimate Partner Violence:     Fear of Current or Ex-Partner:     Emotionally Abused:     Physically Abused:     Sexually Abused:        No Known Allergies    Current Outpatient Medications   Medication Sig Dispense Refill    ergocalciferol (VITAMIN D2) 50,000 units Take 1 capsule (50,000 Units total) by mouth once a week Please send by  and payroll deductible  Badge # [id number] 12 capsule 0    vitamin B-12 (VITAMIN B-12) 1,000 mcg tablet 1,000 mcg every 30 (thirty) days Injection monthly      zafirlukast (ACCOLATE) 20 MG tablet Take 20 mg by mouth 2 (two) times a day       No current facility-administered medications for this visit  (Not in a hospital admission)        Objective:     24 Hour Vitals Assessment:     Vitals:    09/16/21 1356   BP: 110/70   Pulse: 76   Resp: 16   Temp: 97 5 °F (36 4 °C)       PHYSICIAN EXAM:    General: Appearance: alert, cooperative, no distress  HEENT: Normocephalic, atraumatic  No scleral icterus  conjunctivae clear  EOMI  Chest: No tenderness to palpation  No open wound noted  Lungs: Clear to auscultation bilaterally, Respirations unlabored  Cardiac: Regular rate and rhythm, +S1and S2, -r/m/g  Abdomen: Soft, non-tender, non-distended  Bowel sounds are normal   Extremities:  No edema, cyanosis, clubbing  Skin: Skin color, turgor are normal  No rashes  Neurologic: Awake, Alert, and oriented, no gross focal deficits noted b/l  DATA REVIEW:    Pathology Result:    Final Diagnosis   Date Value Ref Range Status   10/30/2020   Final    A  Breast, Left, capsulectomy:  - Dense fibrous tissue, partially calcified, consistent with implant capsule       B  Foreign body, Breast, Left:  - Breast implant, gross examination  Image Results:   Image result are reviewed and documented in Hematology/Oncology history  I personally reviewed these images  XR chest pa & lateral  2 16 840 1 [id number] 1 11 [id number]  CT head w wo contrast  2 16 840 1 [id number] 1 11 [id number]      LABS:  Lab data are reviewed and documented in HemOnc history  Lab Results   Component Value Date    HGB 10 8 (L) 09/02/2021    HCT 35 4 09/02/2021    MCV 78 (L) 09/02/2021     (H) 09/02/2021    WBC 7 70 09/02/2021    NRBC 0 09/02/2021     Lab Results   Component Value Date    K 4 4 10/08/2020     10/08/2020    CO2 27 10/08/2020    BUN 15 10/08/2020    CREATININE 1 00 10/08/2020    GLUF 55 (L) 10/08/2020    CALCIUM 8 0 (L) 10/08/2020    AST 25 08/19/2018    ALT 32 08/19/2018    ALKPHOS 67 08/19/2018    EGFR 72 10/08/2020       Lab Results   Component Value Date    IRON 31 (L) 09/02/2021    FERRITIN 5 (L) 09/02/2021    FERRITIN 6 (L) 10/08/2020       Lab Results   Component Value Date    GRMGKMYB27 483 09/02/2021    NINHJPZV25 2,612 (H) 10/08/2020    ZRYOOMDB26 582 08/19/2018       No results for input(s): WBC, CREAT in the last 72 hours      Invalid input(s):  PLT     By:  Mony Gipson MD, 9/16/2021, 2:18 PM

## 2021-09-16 ENCOUNTER — CONSULT (OUTPATIENT)
Dept: HEMATOLOGY ONCOLOGY | Facility: CLINIC | Age: 41
End: 2021-09-16
Payer: COMMERCIAL

## 2021-09-16 VITALS
HEART RATE: 76 BPM | HEIGHT: 62 IN | DIASTOLIC BLOOD PRESSURE: 70 MMHG | TEMPERATURE: 97.5 F | RESPIRATION RATE: 16 BRPM | WEIGHT: 146 LBS | BODY MASS INDEX: 26.87 KG/M2 | SYSTOLIC BLOOD PRESSURE: 110 MMHG

## 2021-09-16 DIAGNOSIS — Z98.84 STATUS POST BARIATRIC SURGERY: ICD-10-CM

## 2021-09-16 DIAGNOSIS — D50.8 OTHER IRON DEFICIENCY ANEMIA: Primary | ICD-10-CM

## 2021-09-16 PROCEDURE — 99244 OFF/OP CNSLTJ NEW/EST MOD 40: CPT | Performed by: INTERNAL MEDICINE

## 2021-09-16 RX ORDER — ZAFIRLUKAST 20 MG/1
20 TABLET, FILM COATED ORAL 2 TIMES DAILY
COMMUNITY
Start: 2021-08-31

## 2021-09-16 RX ORDER — SODIUM CHLORIDE 9 MG/ML
20 INJECTION, SOLUTION INTRAVENOUS ONCE
Status: CANCELLED | OUTPATIENT
Start: 2021-09-23

## 2021-09-30 ENCOUNTER — HOSPITAL ENCOUNTER (OUTPATIENT)
Dept: INFUSION CENTER | Facility: CLINIC | Age: 41
Discharge: HOME | End: 2021-09-30
Payer: COMMERCIAL

## 2021-09-30 VITALS
DIASTOLIC BLOOD PRESSURE: 81 MMHG | HEART RATE: 85 BPM | SYSTOLIC BLOOD PRESSURE: 125 MMHG | RESPIRATION RATE: 18 BRPM | TEMPERATURE: 98.8 F

## 2021-09-30 DIAGNOSIS — D50.8 OTHER IRON DEFICIENCY ANEMIA: Primary | ICD-10-CM

## 2021-09-30 PROCEDURE — 96365 THER/PROPH/DIAG IV INF INIT: CPT

## 2021-09-30 RX ORDER — SODIUM CHLORIDE 9 MG/ML
20 INJECTION, SOLUTION INTRAVENOUS ONCE
Status: CANCELLED | OUTPATIENT
Start: 2021-10-07

## 2021-09-30 RX ORDER — SODIUM CHLORIDE 9 MG/ML
20 INJECTION, SOLUTION INTRAVENOUS ONCE
Status: COMPLETED | OUTPATIENT
Start: 2021-09-30 | End: 2021-09-30

## 2021-09-30 RX ADMIN — SODIUM CHLORIDE 200 MG: 9 INJECTION, SOLUTION INTRAVENOUS at 13:45

## 2021-09-30 RX ADMIN — SODIUM CHLORIDE 20 ML/HR: 0.9 INJECTION, SOLUTION INTRAVENOUS at 13:45

## 2021-10-07 ENCOUNTER — HOSPITAL ENCOUNTER (OUTPATIENT)
Dept: INFUSION CENTER | Facility: CLINIC | Age: 41
Discharge: HOME | End: 2021-10-07
Payer: COMMERCIAL

## 2021-10-07 VITALS
TEMPERATURE: 98.6 F | SYSTOLIC BLOOD PRESSURE: 117 MMHG | RESPIRATION RATE: 18 BRPM | DIASTOLIC BLOOD PRESSURE: 72 MMHG | HEART RATE: 80 BPM

## 2021-10-07 DIAGNOSIS — D50.8 OTHER IRON DEFICIENCY ANEMIA: Primary | ICD-10-CM

## 2021-10-07 PROCEDURE — 96365 THER/PROPH/DIAG IV INF INIT: CPT

## 2021-10-07 RX ORDER — SODIUM CHLORIDE 9 MG/ML
20 INJECTION, SOLUTION INTRAVENOUS ONCE
Status: CANCELLED | OUTPATIENT
Start: 2021-10-14

## 2021-10-07 RX ORDER — SODIUM CHLORIDE 9 MG/ML
20 INJECTION, SOLUTION INTRAVENOUS ONCE
Status: COMPLETED | OUTPATIENT
Start: 2021-10-07 | End: 2021-10-07

## 2021-10-07 RX ADMIN — SODIUM CHLORIDE 20 ML/HR: 0.9 INJECTION, SOLUTION INTRAVENOUS at 13:51

## 2021-10-07 RX ADMIN — SODIUM CHLORIDE 200 MG: 9 INJECTION, SOLUTION INTRAVENOUS at 13:53

## 2021-10-14 ENCOUNTER — HOSPITAL ENCOUNTER (OUTPATIENT)
Dept: INFUSION CENTER | Facility: CLINIC | Age: 41
Discharge: HOME | End: 2021-10-14
Payer: COMMERCIAL

## 2021-10-14 VITALS
SYSTOLIC BLOOD PRESSURE: 119 MMHG | HEART RATE: 67 BPM | RESPIRATION RATE: 18 BRPM | TEMPERATURE: 98.2 F | DIASTOLIC BLOOD PRESSURE: 72 MMHG

## 2021-10-14 DIAGNOSIS — D50.8 OTHER IRON DEFICIENCY ANEMIA: Primary | ICD-10-CM

## 2021-10-14 PROCEDURE — 96365 THER/PROPH/DIAG IV INF INIT: CPT

## 2021-10-14 RX ORDER — SODIUM CHLORIDE 9 MG/ML
20 INJECTION, SOLUTION INTRAVENOUS ONCE
Status: COMPLETED | OUTPATIENT
Start: 2021-10-14 | End: 2021-10-14

## 2021-10-14 RX ORDER — SODIUM CHLORIDE 9 MG/ML
20 INJECTION, SOLUTION INTRAVENOUS ONCE
Status: CANCELLED | OUTPATIENT
Start: 2021-10-21

## 2021-10-14 RX ADMIN — SODIUM CHLORIDE 200 MG: 9 INJECTION, SOLUTION INTRAVENOUS at 13:56

## 2021-10-14 RX ADMIN — SODIUM CHLORIDE 20 ML/HR: 0.9 INJECTION, SOLUTION INTRAVENOUS at 13:49

## 2021-10-21 ENCOUNTER — HOSPITAL ENCOUNTER (OUTPATIENT)
Dept: INFUSION CENTER | Facility: CLINIC | Age: 41
Discharge: HOME | End: 2021-10-21
Payer: COMMERCIAL

## 2021-10-21 VITALS
RESPIRATION RATE: 18 BRPM | DIASTOLIC BLOOD PRESSURE: 73 MMHG | SYSTOLIC BLOOD PRESSURE: 110 MMHG | HEART RATE: 80 BPM | TEMPERATURE: 98.3 F

## 2021-10-21 DIAGNOSIS — D50.8 OTHER IRON DEFICIENCY ANEMIA: Primary | ICD-10-CM

## 2021-10-21 PROCEDURE — 96365 THER/PROPH/DIAG IV INF INIT: CPT

## 2021-10-21 RX ORDER — SODIUM CHLORIDE 9 MG/ML
20 INJECTION, SOLUTION INTRAVENOUS ONCE
Status: CANCELLED | OUTPATIENT
Start: 2021-10-21

## 2021-10-21 RX ORDER — SODIUM CHLORIDE 9 MG/ML
20 INJECTION, SOLUTION INTRAVENOUS ONCE
Status: COMPLETED | OUTPATIENT
Start: 2021-10-21 | End: 2021-10-21

## 2021-10-21 RX ADMIN — SODIUM CHLORIDE 20 ML/HR: 0.9 INJECTION, SOLUTION INTRAVENOUS at 13:43

## 2021-10-21 RX ADMIN — SODIUM CHLORIDE 200 MG: 9 INJECTION, SOLUTION INTRAVENOUS at 13:43

## 2021-12-16 ENCOUNTER — TELEMEDICINE (OUTPATIENT)
Dept: FAMILY MEDICINE CLINIC | Facility: CLINIC | Age: 41
End: 2021-12-16
Payer: COMMERCIAL

## 2021-12-16 DIAGNOSIS — Z20.822 EXPOSURE TO COVID-19 VIRUS: Primary | ICD-10-CM

## 2021-12-16 PROCEDURE — U0005 INFEC AGEN DETEC AMPLI PROBE: HCPCS | Performed by: NURSE PRACTITIONER

## 2021-12-16 PROCEDURE — U0003 INFECTIOUS AGENT DETECTION BY NUCLEIC ACID (DNA OR RNA); SEVERE ACUTE RESPIRATORY SYNDROME CORONAVIRUS 2 (SARS-COV-2) (CORONAVIRUS DISEASE [COVID-19]), AMPLIFIED PROBE TECHNIQUE, MAKING USE OF HIGH THROUGHPUT TECHNOLOGIES AS DESCRIBED BY CMS-2020-01-R: HCPCS | Performed by: NURSE PRACTITIONER

## 2021-12-16 PROCEDURE — 99213 OFFICE O/P EST LOW 20 MIN: CPT | Performed by: NURSE PRACTITIONER

## 2021-12-17 LAB — SARS-COV-2 RNA RESP QL NAA+PROBE: NEGATIVE

## 2021-12-20 ENCOUNTER — TELEPHONE (OUTPATIENT)
Dept: FAMILY MEDICINE CLINIC | Facility: CLINIC | Age: 41
End: 2021-12-20

## 2021-12-20 DIAGNOSIS — Z20.822 EXPOSURE TO COVID-19 VIRUS: Primary | ICD-10-CM

## 2021-12-20 PROCEDURE — U0005 INFEC AGEN DETEC AMPLI PROBE: HCPCS | Performed by: NURSE PRACTITIONER

## 2021-12-20 PROCEDURE — U0003 INFECTIOUS AGENT DETECTION BY NUCLEIC ACID (DNA OR RNA); SEVERE ACUTE RESPIRATORY SYNDROME CORONAVIRUS 2 (SARS-COV-2) (CORONAVIRUS DISEASE [COVID-19]), AMPLIFIED PROBE TECHNIQUE, MAKING USE OF HIGH THROUGHPUT TECHNOLOGIES AS DESCRIBED BY CMS-2020-01-R: HCPCS | Performed by: NURSE PRACTITIONER

## 2021-12-21 LAB — SARS-COV-2 RNA RESP QL NAA+PROBE: NEGATIVE

## 2022-01-10 ENCOUNTER — APPOINTMENT (OUTPATIENT)
Dept: LAB | Facility: CLINIC | Age: 42
End: 2022-01-10
Payer: COMMERCIAL

## 2022-01-10 DIAGNOSIS — T85.44XA CAPSULAR CONTRACTURE OF BREAST IMPLANT, INITIAL ENCOUNTER: ICD-10-CM

## 2022-01-10 LAB
ALBUMIN SERPL BCP-MCNC: 3.6 G/DL (ref 3.5–5)
ALP SERPL-CCNC: 59 U/L (ref 46–116)
ALT SERPL W P-5'-P-CCNC: 27 U/L (ref 12–78)
AST SERPL W P-5'-P-CCNC: 18 U/L (ref 5–45)
BILIRUB DIRECT SERPL-MCNC: 0.09 MG/DL (ref 0–0.2)
BILIRUB SERPL-MCNC: 0.31 MG/DL (ref 0.2–1)
PROT SERPL-MCNC: 7.3 G/DL (ref 6.4–8.2)

## 2022-01-10 PROCEDURE — 80076 HEPATIC FUNCTION PANEL: CPT

## 2022-01-10 PROCEDURE — 36415 COLL VENOUS BLD VENIPUNCTURE: CPT

## 2022-01-17 DIAGNOSIS — D50.8 OTHER IRON DEFICIENCY ANEMIA: Primary | ICD-10-CM

## 2022-01-19 ENCOUNTER — APPOINTMENT (OUTPATIENT)
Dept: LAB | Facility: CLINIC | Age: 42
End: 2022-01-19
Payer: COMMERCIAL

## 2022-01-19 DIAGNOSIS — D50.8 OTHER IRON DEFICIENCY ANEMIA: ICD-10-CM

## 2022-01-19 DIAGNOSIS — D50.8 OTHER IRON DEFICIENCY ANEMIA: Primary | ICD-10-CM

## 2022-01-19 LAB
BASOPHILS # BLD AUTO: 0.05 THOUSANDS/ΜL (ref 0–0.1)
BASOPHILS NFR BLD AUTO: 1 % (ref 0–1)
EOSINOPHIL # BLD AUTO: 0.13 THOUSAND/ΜL (ref 0–0.61)
EOSINOPHIL NFR BLD AUTO: 2 % (ref 0–6)
ERYTHROCYTE [DISTWIDTH] IN BLOOD BY AUTOMATED COUNT: 14.7 % (ref 11.6–15.1)
FERRITIN SERPL-MCNC: 16 NG/ML (ref 8–388)
HCT VFR BLD AUTO: 41.7 % (ref 34.8–46.1)
HGB BLD-MCNC: 13.9 G/DL (ref 11.5–15.4)
IMM GRANULOCYTES # BLD AUTO: 0.01 THOUSAND/UL (ref 0–0.2)
IMM GRANULOCYTES NFR BLD AUTO: 0 % (ref 0–2)
IRON SATN MFR SERPL: 27 % (ref 15–50)
IRON SERPL-MCNC: 113 UG/DL (ref 50–170)
LYMPHOCYTES # BLD AUTO: 2.45 THOUSANDS/ΜL (ref 0.6–4.47)
LYMPHOCYTES NFR BLD AUTO: 39 % (ref 14–44)
MCH RBC QN AUTO: 30.4 PG (ref 26.8–34.3)
MCHC RBC AUTO-ENTMCNC: 33.3 G/DL (ref 31.4–37.4)
MCV RBC AUTO: 91 FL (ref 82–98)
MONOCYTES # BLD AUTO: 0.51 THOUSAND/ΜL (ref 0.17–1.22)
MONOCYTES NFR BLD AUTO: 8 % (ref 4–12)
NEUTROPHILS # BLD AUTO: 3.14 THOUSANDS/ΜL (ref 1.85–7.62)
NEUTS SEG NFR BLD AUTO: 50 % (ref 43–75)
NRBC BLD AUTO-RTO: 0 /100 WBCS
PLATELET # BLD AUTO: 274 THOUSANDS/UL (ref 149–390)
PMV BLD AUTO: 9.4 FL (ref 8.9–12.7)
RBC # BLD AUTO: 4.57 MILLION/UL (ref 3.81–5.12)
TIBC SERPL-MCNC: 420 UG/DL (ref 250–450)
WBC # BLD AUTO: 6.29 THOUSAND/UL (ref 4.31–10.16)

## 2022-01-19 PROCEDURE — 36415 COLL VENOUS BLD VENIPUNCTURE: CPT

## 2022-01-19 PROCEDURE — 83550 IRON BINDING TEST: CPT

## 2022-01-19 PROCEDURE — 85025 COMPLETE CBC W/AUTO DIFF WBC: CPT

## 2022-01-19 PROCEDURE — 82728 ASSAY OF FERRITIN: CPT

## 2022-01-19 PROCEDURE — 83540 ASSAY OF IRON: CPT

## 2022-01-27 ENCOUNTER — TELEPHONE (OUTPATIENT)
Dept: HEMATOLOGY ONCOLOGY | Facility: CLINIC | Age: 42
End: 2022-01-27

## 2022-01-27 NOTE — TELEPHONE ENCOUNTER
I spoke to patient about her missed appointment today and she stated that she forgot to call and that her son has covid and will call back to R/S

## 2022-01-28 ENCOUNTER — OFFICE VISIT (OUTPATIENT)
Dept: FAMILY MEDICINE CLINIC | Facility: CLINIC | Age: 42
End: 2022-01-28
Payer: COMMERCIAL

## 2022-01-28 DIAGNOSIS — R51.9 NONINTRACTABLE HEADACHE, UNSPECIFIED CHRONICITY PATTERN, UNSPECIFIED HEADACHE TYPE: ICD-10-CM

## 2022-01-28 DIAGNOSIS — Z20.822 EXPOSURE TO COVID-19 VIRUS: Primary | ICD-10-CM

## 2022-01-28 LAB
SARS-COV-2 AG UPPER RESP QL IA: NEGATIVE
VALID CONTROL: NORMAL

## 2022-01-28 PROCEDURE — U0005 INFEC AGEN DETEC AMPLI PROBE: HCPCS | Performed by: NURSE PRACTITIONER

## 2022-01-28 PROCEDURE — 87811 SARS-COV-2 COVID19 W/OPTIC: CPT | Performed by: NURSE PRACTITIONER

## 2022-01-28 PROCEDURE — U0003 INFECTIOUS AGENT DETECTION BY NUCLEIC ACID (DNA OR RNA); SEVERE ACUTE RESPIRATORY SYNDROME CORONAVIRUS 2 (SARS-COV-2) (CORONAVIRUS DISEASE [COVID-19]), AMPLIFIED PROBE TECHNIQUE, MAKING USE OF HIGH THROUGHPUT TECHNOLOGIES AS DESCRIBED BY CMS-2020-01-R: HCPCS | Performed by: NURSE PRACTITIONER

## 2022-01-28 PROCEDURE — 99213 OFFICE O/P EST LOW 20 MIN: CPT | Performed by: NURSE PRACTITIONER

## 2022-01-28 NOTE — ASSESSMENT & PLAN NOTE
Patient with direct known exposure to household member who tested positive for COVID 01/24/2022  Patient has had COVID vaccine series completed    Patient is currently symptomatic, POCT rapid antigen negative in office, and will recommend COVID PCR testing sent to lab

## 2022-01-28 NOTE — PROGRESS NOTES
COVID-19 Outpatient Progress Note    Assessment/Plan:    Problem List Items Addressed This Visit        Other    Exposure to COVID-19 virus - Primary     Patient with direct known exposure to household member who tested positive for COVID 01/24/2022  Patient has had COVID vaccine series completed  Patient is currently symptomatic, POCT rapid antigen negative in office, and will recommend COVID PCR testing sent to lab         Relevant Orders    COVID Only - Office Collect    Nonintractable headache     Acute symptomatic patient with new onset fever headaches body aches sinus pressure and diaphoresis starting 01/28/2022 after known exposure to household member who tested positive for COVID 01/24/2022  Patient has had COVID Moderna vaccine series completed  POCT rapid antigen covid testing negative in office  Will recommend increase fluids, otc tylenol prn for fever/bodyaches, COVID vitamins COVID testing sent to lab  Patient call with any worsening of symptoms         Relevant Orders    COVID Only - Office Collect    POCT Rapid Covid Ag (Completed)         Disposition:     Rapid antigen testing was performed and the result is negative for COVID-19  I recommended the patient to come to our office to perform rapid antigen testing for COVID-19  PCR testing will be performed to test for COVID-19  Recommended patient to come to the office to test for COVID-19  I have spent 15 minutes directly with the patient  Greater than 50% of this time was spent in counseling/coordination of care regarding: instructions for management and patient and family education  Encounter provider RAOUL Marie    Provider located at [id number] [address] [address] [phone]    Recent Visits  No visits were found meeting these conditions    Showing recent visits within past 7 days and meeting all other requirements  Today's Visits  Date Type Provider Dept   01/28/22 Office Visit RAOUL Marie [facility]   Showing today's visits and meeting all other requirements  Future Appointments  No visits were found meeting these conditions  Showing future appointments within next 150 days and meeting all other requirements     Subjective:   Aden Hernández is a 46 y o  female who is concerned about COVID-19  Patient's symptoms include fever, fatigue, shortness of breath, myalgias and headache  Patient denies chills, malaise, congestion, rhinorrhea, sore throat, anosmia, loss of taste, cough, chest tightness, abdominal pain, nausea, vomiting and diarrhea       - Date of symptom onset: 1/28/2022  - Date of exposure: 1/24/2022      COVID-19 vaccination status: Fully vaccinated (primary series)    Exposure:   Contact with a person who is under investigation (PUI) for or who is positive for COVID-19 within the last 14 days?: Yes    Hospitalized recently for fever and/or lower respiratory symptoms?: No      Currently a healthcare worker that is involved in direct patient care?: No      Works in a special setting where the risk of COVID-19 transmission may be high? (this may include long-term care, correctional and jail facilities; homeless shelters; assisted-living facilities and group homes ): No      Resident in a special setting where the risk of COVID-19 transmission may be high? (this may include long-term care, correctional and jail facilities; homeless shelters; assisted-living facilities and group homes ): No      Lab Results   Component Value Date    SARSCOV2 Negative 12/20/2021    [address] [place] Negative 01/28/2022     Past Medical History:   Diagnosis Date    Anemia     vit B12 def    History of transfusion     iron infusions    Intertrigo 6/13/2019    Tinea corporis 5/8/2019    Tobacco abuse     Vitamin D deficiency      Past Surgical History:   Procedure Laterality Date    GASTRIC BYPASS  2008    PANNICULECTOMY N/A 8/19/2019    Procedure: PANNICULECTOMY;  Surgeon: Cedric Ansari MD;  [facility];  Service: Plastics    [place] BREAST AUGMENTATION WITH IMPLANT Left 10/30/2020    Procedure: IMPLANT EXCHANGE;  Surgeon: Alli Rosa MD;  Location: [facility] MAIN OR;  Service: Plastics    [place] ISAI-IMPLANT CAPSULECTOMY BREAST COMPLETE Left 10/30/2020    Procedure: CAPSULECTOMY;  Surgeon: Alli Rosa MD;  Location: [facility] MAIN OR;  Service: Plastics    [place] SUSPENSION OF BREAST Right 10/30/2020    Procedure: MASTOPEXY BREAST WITH AUGMENTATION;  Surgeon: Alli Rosa MD;  Location: [facility] MAIN OR;  Service: [address] [place] [address]    TUBAL LIGATION       Current Outpatient Medications   Medication Sig Dispense Refill    ergocalciferol (VITAMIN D2) 50,000 units Take 1 capsule (50,000 Units total) by mouth once a week Please send by  and payroll deductible  Badge # [id number] 12 capsule 0    vitamin B-12 (VITAMIN B-12) 1,000 mcg tablet 1,000 mcg every 30 (thirty) days Injection monthly      zafirlukast (ACCOLATE) 20 MG tablet Take 20 mg by mouth 2 (two) times a day       No current facility-administered medications for this visit  No Known Allergies    Review of Systems   Constitutional: Positive for fatigue and fever  Negative for chills  HENT: Negative for congestion, rhinorrhea, sneezing and sore throat  Respiratory: Positive for shortness of breath  Negative for cough and chest tightness  Gastrointestinal: Negative for abdominal pain, diarrhea, nausea and vomiting  Musculoskeletal: Positive for myalgias  Neurological: Positive for headaches  Objective: There were no vitals filed for this visit  Physical Exam  Vitals and nursing note reviewed  Constitutional:       General: She is not in acute distress  Appearance: Normal appearance  She is ill-appearing  She is not toxic-appearing or diaphoretic     HENT:      Head: Normocephalic and atraumatic  Right Ear: Tympanic membrane, ear canal and external ear normal  There is no impacted cerumen  Left Ear: Tympanic membrane, ear canal and external ear normal  There is no impacted cerumen  Nose: No rhinorrhea  Eyes:      General: No scleral icterus  Right eye: No discharge  Left eye: No discharge  Conjunctiva/sclera: Conjunctivae normal    Cardiovascular:      Rate and Rhythm: Normal rate and regular rhythm  Pulses: Normal pulses  Heart sounds: Normal heart sounds  Pulmonary:      Effort: Pulmonary effort is normal  No respiratory distress  Breath sounds: No wheezing, rhonchi or rales  Musculoskeletal:         General: Normal range of motion  Cervical back: Normal range of motion  Skin:     Coloration: Skin is not jaundiced or pale  Findings: No bruising, erythema, lesion or rash  Neurological:      Mental Status: She is alert and oriented to person, place, and time  Psychiatric:         Mood and Affect: Mood normal          Behavior: Behavior normal          Thought Content: Thought content normal          Judgment: Judgment normal          VIRTUAL VISIT DISCLAIMER    Adolfogutierrez Dickerson verbally agrees to participate in [employer]  Pt is aware that [employer] could be limited without vital signs or the ability to perform a full hands-on physical [place] understands she or the provider may request at any time to terminate the video visit and request the patient to seek care or treatment in person

## 2022-01-28 NOTE — ASSESSMENT & PLAN NOTE
Acute symptomatic patient with new onset fever headaches body aches sinus pressure and diaphoresis starting 01/28/2022 after known exposure to household member who tested positive for COVID 01/24/2022  Patient has had COVID Moderna vaccine series completed  POCT rapid antigen covid testing negative in office  Will recommend increase fluids, otc tylenol prn for fever/bodyaches, COVID vitamins COVID testing sent to lab    Patient call with any worsening of symptoms

## 2022-01-29 LAB — SARS-COV-2 RNA RESP QL NAA+PROBE: POSITIVE

## 2022-03-11 ENCOUNTER — IMMUNIZATIONS (OUTPATIENT)
Dept: FAMILY MEDICINE CLINIC | Facility: HOSPITAL | Age: 42
End: 2022-03-11

## 2022-03-11 DIAGNOSIS — Z23 ENCOUNTER FOR IMMUNIZATION: Primary | ICD-10-CM

## 2022-03-11 PROCEDURE — 0064A COVID-19 MODERNA VACC 0.25 ML BOOSTER: CPT

## 2022-03-11 PROCEDURE — 91306 COVID-19 MODERNA VACC 0.25 ML BOOSTER: CPT

## 2022-03-25 ENCOUNTER — APPOINTMENT (OUTPATIENT)
Dept: LAB | Facility: CLINIC | Age: 42
End: 2022-03-25
Payer: COMMERCIAL

## 2022-03-25 DIAGNOSIS — T85.44XA CAPSULAR CONTRACTURE OF BREAST IMPLANT, INITIAL ENCOUNTER: ICD-10-CM

## 2022-03-25 LAB
ALBUMIN SERPL BCP-MCNC: 3.6 G/DL (ref 3.5–5)
ALP SERPL-CCNC: 58 U/L (ref 46–116)
ALT SERPL W P-5'-P-CCNC: 28 U/L (ref 12–78)
AST SERPL W P-5'-P-CCNC: 24 U/L (ref 5–45)
BILIRUB DIRECT SERPL-MCNC: 0.13 MG/DL (ref 0–0.2)
BILIRUB SERPL-MCNC: 0.46 MG/DL (ref 0.2–1)
PROT SERPL-MCNC: 7 G/DL (ref 6.4–8.2)

## 2022-03-25 PROCEDURE — 80076 HEPATIC FUNCTION PANEL: CPT

## 2022-03-25 PROCEDURE — 36415 COLL VENOUS BLD VENIPUNCTURE: CPT

## 2022-05-16 ENCOUNTER — CLINICAL SUPPORT (OUTPATIENT)
Dept: FAMILY MEDICINE CLINIC | Facility: CLINIC | Age: 42
End: 2022-05-16

## 2022-05-16 DIAGNOSIS — R35.0 FREQUENCY OF URINATION: Primary | ICD-10-CM

## 2022-05-16 PROCEDURE — 87086 URINE CULTURE/COLONY COUNT: CPT | Performed by: FAMILY MEDICINE

## 2022-05-17 LAB — BACTERIA UR CULT: NORMAL

## 2022-07-26 ENCOUNTER — TELEPHONE (OUTPATIENT)
Dept: FAMILY MEDICINE CLINIC | Facility: CLINIC | Age: 42
End: 2022-07-26

## 2022-07-26 DIAGNOSIS — D50.8 OTHER IRON DEFICIENCY ANEMIA: Primary | ICD-10-CM

## 2022-07-26 DIAGNOSIS — E55.9 VITAMIN D DEFICIENCY: ICD-10-CM

## 2022-07-26 DIAGNOSIS — D51.3 OTHER DIETARY VITAMIN B12 DEFICIENCY ANEMIA: ICD-10-CM

## 2022-07-26 NOTE — TELEPHONE ENCOUNTER
Baylee Prakash wants to know if theres any other b/w besides lipid and a1c you want her to get   Please advise

## 2022-07-28 ENCOUNTER — TRANSCRIBE ORDERS (OUTPATIENT)
Dept: LAB | Facility: CLINIC | Age: 42
End: 2022-07-28

## 2022-07-28 ENCOUNTER — APPOINTMENT (OUTPATIENT)
Dept: LAB | Facility: CLINIC | Age: 42
End: 2022-07-28
Payer: COMMERCIAL

## 2022-07-28 DIAGNOSIS — D50.8 OTHER IRON DEFICIENCY ANEMIA: ICD-10-CM

## 2022-07-28 DIAGNOSIS — D51.3 OTHER DIETARY VITAMIN B12 DEFICIENCY ANEMIA: ICD-10-CM

## 2022-07-28 DIAGNOSIS — Z00.8 HEALTH EXAMINATION IN POPULATION SURVEY: ICD-10-CM

## 2022-07-28 DIAGNOSIS — Z00.8 HEALTH EXAMINATION IN POPULATION SURVEY: Primary | ICD-10-CM

## 2022-07-28 DIAGNOSIS — E55.9 VITAMIN D DEFICIENCY: ICD-10-CM

## 2022-07-28 LAB
25(OH)D3 SERPL-MCNC: 19.2 NG/ML (ref 30–100)
ANION GAP SERPL CALCULATED.3IONS-SCNC: 3 MMOL/L (ref 4–13)
BASOPHILS # BLD AUTO: 0.07 THOUSANDS/ΜL (ref 0–0.1)
BASOPHILS NFR BLD AUTO: 1 % (ref 0–1)
BUN SERPL-MCNC: 12 MG/DL (ref 5–25)
CALCIUM SERPL-MCNC: 8.9 MG/DL (ref 8.3–10.1)
CHLORIDE SERPL-SCNC: 106 MMOL/L (ref 96–108)
CHOLEST SERPL-MCNC: 167 MG/DL
CO2 SERPL-SCNC: 28 MMOL/L (ref 21–32)
CREAT SERPL-MCNC: 0.74 MG/DL (ref 0.6–1.3)
EOSINOPHIL # BLD AUTO: 0.15 THOUSAND/ΜL (ref 0–0.61)
EOSINOPHIL NFR BLD AUTO: 3 % (ref 0–6)
ERYTHROCYTE [DISTWIDTH] IN BLOOD BY AUTOMATED COUNT: 14.6 % (ref 11.6–15.1)
EST. AVERAGE GLUCOSE BLD GHB EST-MCNC: 97 MG/DL
FERRITIN SERPL-MCNC: 9 NG/ML (ref 8–388)
GFR SERPL CREATININE-BSD FRML MDRD: 102 ML/MIN/1.73SQ M
GLUCOSE P FAST SERPL-MCNC: 75 MG/DL (ref 65–99)
HBA1C MFR BLD: 5 %
HCT VFR BLD AUTO: 42 % (ref 34.8–46.1)
HDLC SERPL-MCNC: 108 MG/DL
HGB BLD-MCNC: 13.7 G/DL (ref 11.5–15.4)
IMM GRANULOCYTES # BLD AUTO: 0.01 THOUSAND/UL (ref 0–0.2)
IMM GRANULOCYTES NFR BLD AUTO: 0 % (ref 0–2)
IRON SERPL-MCNC: 70 UG/DL (ref 50–170)
LDLC SERPL CALC-MCNC: 45 MG/DL (ref 0–100)
LYMPHOCYTES # BLD AUTO: 1.62 THOUSANDS/ΜL (ref 0.6–4.47)
LYMPHOCYTES NFR BLD AUTO: 31 % (ref 14–44)
MCH RBC QN AUTO: 30.8 PG (ref 26.8–34.3)
MCHC RBC AUTO-ENTMCNC: 32.6 G/DL (ref 31.4–37.4)
MCV RBC AUTO: 94 FL (ref 82–98)
MONOCYTES # BLD AUTO: 0.51 THOUSAND/ΜL (ref 0.17–1.22)
MONOCYTES NFR BLD AUTO: 10 % (ref 4–12)
NEUTROPHILS # BLD AUTO: 2.95 THOUSANDS/ΜL (ref 1.85–7.62)
NEUTS SEG NFR BLD AUTO: 55 % (ref 43–75)
NONHDLC SERPL-MCNC: 59 MG/DL
NRBC BLD AUTO-RTO: 0 /100 WBCS
PLATELET # BLD AUTO: 319 THOUSANDS/UL (ref 149–390)
PMV BLD AUTO: 9.9 FL (ref 8.9–12.7)
POTASSIUM SERPL-SCNC: 4.9 MMOL/L (ref 3.5–5.3)
RBC # BLD AUTO: 4.45 MILLION/UL (ref 3.81–5.12)
SODIUM SERPL-SCNC: 137 MMOL/L (ref 135–147)
TIBC SERPL-MCNC: 504 UG/DL (ref 250–450)
TRIGL SERPL-MCNC: 71 MG/DL
WBC # BLD AUTO: 5.31 THOUSAND/UL (ref 4.31–10.16)

## 2022-07-28 PROCEDURE — 82728 ASSAY OF FERRITIN: CPT

## 2022-07-28 PROCEDURE — 83540 ASSAY OF IRON: CPT

## 2022-07-28 PROCEDURE — 36415 COLL VENOUS BLD VENIPUNCTURE: CPT

## 2022-07-28 PROCEDURE — 83036 HEMOGLOBIN GLYCOSYLATED A1C: CPT

## 2022-07-28 PROCEDURE — 82306 VITAMIN D 25 HYDROXY: CPT

## 2022-07-28 PROCEDURE — 85025 COMPLETE CBC W/AUTO DIFF WBC: CPT

## 2022-07-28 PROCEDURE — 80061 LIPID PANEL: CPT

## 2022-07-28 PROCEDURE — 83550 IRON BINDING TEST: CPT

## 2022-07-28 PROCEDURE — 80048 BASIC METABOLIC PNL TOTAL CA: CPT

## 2022-07-28 PROCEDURE — 82607 VITAMIN B-12: CPT

## 2022-07-29 DIAGNOSIS — E55.9 VITAMIN D DEFICIENCY: ICD-10-CM

## 2022-07-29 RX ORDER — ERGOCALCIFEROL 1.25 MG/1
50000 CAPSULE ORAL WEEKLY
Qty: 12 CAPSULE | Refills: 0 | Status: SHIPPED | OUTPATIENT
Start: 2022-07-29

## 2022-07-29 NOTE — TELEPHONE ENCOUNTER
----- Message from Ronni Burrows MD sent at 7/29/2022  8:38 AM EDT -----  Low vitamin-D start vitamin-D 50,000 once a week for 12 week then 50,000 every other week

## 2022-07-30 LAB — VIT B12 SERPL-MCNC: 439 PG/ML (ref 100–900)

## 2022-08-01 ENCOUNTER — OFFICE VISIT (OUTPATIENT)
Dept: FAMILY MEDICINE CLINIC | Facility: CLINIC | Age: 42
End: 2022-08-01
Payer: COMMERCIAL

## 2022-08-01 VITALS
HEIGHT: 64 IN | HEART RATE: 76 BPM | TEMPERATURE: 98.9 F | DIASTOLIC BLOOD PRESSURE: 70 MMHG | BODY MASS INDEX: 23.9 KG/M2 | WEIGHT: 140 LBS | OXYGEN SATURATION: 98 % | SYSTOLIC BLOOD PRESSURE: 110 MMHG

## 2022-08-01 DIAGNOSIS — E55.9 VITAMIN D DEFICIENCY: ICD-10-CM

## 2022-08-01 DIAGNOSIS — Z00.01 ENCOUNTER FOR PREVENTATIVE ADULT HEALTH CARE EXAM WITH ABNORMAL FINDINGS: Primary | ICD-10-CM

## 2022-08-01 PROBLEM — E66.3 OVERWEIGHT WITH BODY MASS INDEX (BMI) OF 26 TO 26.9 IN ADULT: Status: RESOLVED | Noted: 2019-05-08 | Resolved: 2022-08-01

## 2022-08-01 PROCEDURE — 99395 PREV VISIT EST AGE 18-39: CPT | Performed by: FAMILY MEDICINE

## 2022-08-01 NOTE — PROGRESS NOTES
ADULT ANNUAL [facility] [address] [facility] PRIMARY CARE [facility]    NAME: Evan Pool  AGE: 46 y o  SEX: female  : 1980     DATE: 2022     Assessment and Plan:     Problem List Items Addressed This Visit        Other    Vitamin D deficiency     A uncontrolled patient history of gastric bypass the recommend to start vitamin-D 68679 International Units once a week for 12 week then every other week         Encounter for preventative adult health care exam with abnormal findings - Primary     Advice and education were given regarding nutrition, aerobic exercises, weight bearing exercises, cardiovascular risk reduction, fall risk reduction, and age appropriate supplements  The patient was counseled regarding instructions for management, risk factor reductions, prognosis, risks and benefits of treatment options, patient and family education, and importance of compliance with treatment  Immunizations and preventive care screenings were discussed with patient today  Appropriate education was printed on patient's after visit summary  Counseling:  Alcohol/drug use: discussed moderation in alcohol intake, the recommendations for healthy alcohol use, and avoidance of illicit drug use  Dental Health: discussed importance of regular tooth brushing, flossing, and dental visits  Injury prevention: discussed safety/seat belts, safety helmets, smoke detectors, carbon dioxide detectors, and smoking near bedding or upholstery  Sexual health: discussed sexually transmitted diseases, partner selection, use of condoms, avoidance of unintended pregnancy, and contraceptive alternatives  Exercise: the importance of regular exercise/physical activity was discussed  Recommend exercise 3-5 times per week for at least 30 minutes  Return in about 1 year (around 2023)       Chief Complaint:     Chief Complaint   Patient presents with   Marie Shaffer Physical Exam      History of Present Illness:     Adult Annual Physical   Patient here for a comprehensive physical exam  The patient reports no problems  Diet and Physical Activity  Diet/Nutrition: A regular diet  Exercise: no formal exercise  Depression Screening  PHQ-2/9 Depression Screening    Little interest or pleasure in doing things: 0 - not at all  Feeling down, depressed, or hopeless: 0 - not at all  PHQ-2 Score: 0  PHQ-2 Interpretation: Negative depression screen       General Health  Sleep: sleeps well  Hearing: normal - bilateral   Vision: no vision problems  Dental: regular dental visits  /GYN Health  Last menstrual period: 07/22/22  Contraceptive method: Tubal ligation  History of STDs?: no      Review of Systems:     Review of Systems   Constitutional: Negative for activity change, appetite change, fatigue and fever  HENT: Negative for congestion, ear pain, sinus pressure, sinus pain and sore throat  Eyes: Negative for pain, discharge, redness and itching  Respiratory: Negative for cough, chest tightness, shortness of breath and stridor  Cardiovascular: Negative for chest pain, palpitations and leg swelling  Gastrointestinal: Negative for abdominal pain, blood in stool, constipation, diarrhea and nausea  Genitourinary: Negative for dysuria, flank pain, frequency and hematuria  Musculoskeletal: Negative for back pain, joint swelling and neck pain  Skin: Negative for pallor and rash  Neurological: Negative for dizziness, tremors, weakness, numbness and headaches  Hematological: Does not bruise/bleed easily        Past Medical History:     Past Medical History:   Diagnosis Date    Anemia     vit B12 def    Encounter for well adult exam with abnormal findings 5/8/2019    Exposure to COVID-19 virus 12/16/2021    History of transfusion     iron infusions    Intertrigo 6/13/2019    Overweight with body mass index (BMI) of 26 to 26 9 in adult 5/8/2019   Cailin Dooley Tinea corporis 5/8/2019    Tobacco abuse     Vitamin D deficiency       Past Surgical History:     Past Surgical History:   Procedure Laterality Date    GASTRIC BYPASS  2008    PANNICULECTOMY N/A 8/19/2019    Procedure: PANNICULECTOMY;  Surgeon: Delphine Simmons MD;  [facility];  Service: Plastics    MD BREAST AUGMENTATION WITH IMPLANT Left 10/30/2020    Procedure: IMPLANT EXCHANGE;  Surgeon: Iván Landrum MD;  Location: [facility] MAIN OR;  Service: Plastics    MD ISAI-IMPLANT CAPSULECTOMY BREAST COMPLETE Left 10/30/2020    Procedure: CAPSULECTOMY;  Surgeon: Iván Landrum MD;  Location: [facility] MAIN OR;  Service: Plastics    MD SUSPENSION OF BREAST Right 10/30/2020    Procedure: MASTOPEXY BREAST WITH AUGMENTATION;  Surgeon: Iván Landrum MD;  Location: [facility] MAIN OR;  Service: [address] [place] [address] Street    TUBAL LIGATION        Social History:     Social History     Socioeconomic History    Marital status: /Civil Union     Spouse name: None    Number of children: None    Years of education: None    Highest education level: None   Occupational History    None   Tobacco Use    Smoking status: Former Smoker     Packs/day: 0 50     Years: 15 00     Pack years: 7 50     Start date: 8/4/2004     Quit date: 3/12/2019     Years since quitting: 3 3    Smokeless tobacco: Never Used    Tobacco comment: quite smoking   Vaping Use    Vaping Use: Never used   Substance and Sexual Activity    Alcohol use:  Yes    Drug use: Never    Sexual activity: Yes     Partners: Male     Birth control/protection: Female Sterilization   Other Topics Concern    None   Social History Narrative    Children: Yes    Hand dominance: right     Social Determinants of Health     Financial Resource Strain: Not on file   Food Insecurity: Not on file   Transportation Needs: Not on file   Physical Activity: Not on file   Stress: Not on file   Social Connections: Not on file   Intimate Partner Violence: Not on file Housing Stability: Not on file      Family History:     Family History   Problem Relation Age of Onset    Hypertension Family     Diabetes Family         Mellitus    Diabetes Mother     Arthritis Mother     Diabetes Father     COPD Father     Asthma Sister     Allergies Sister       Current Medications:     Current Outpatient Medications   Medication Sig Dispense Refill    ergocalciferol (VITAMIN D2) 50,000 units Take 1 capsule (50,000 Units total) by mouth once a week Please send by  and payroll deductible  Badge # [id number] 12 capsule 0    vitamin B-12 (VITAMIN B-12) 1,000 mcg tablet 1,000 mcg every 30 (thirty) days Injection monthly      zafirlukast (ACCOLATE) 20 MG tablet Take 20 mg by mouth 2 (two) times a day       No current facility-administered medications for this visit  Allergies:     No Known Allergies   Physical Exam:     /70   Pulse 76   Temp 98 9 °F (37 2 °C)   Ht 5' 3 5" (1 613 m)   Wt 63 5 kg (140 lb)   LMP 07/22/2022 (Exact Date)   SpO2 98%   Breastfeeding No   BMI 24 41 kg/m²     Physical Exam  Vitals and nursing note reviewed  Constitutional:       General: She is not in acute distress  Appearance: She is well-developed and normal weight  She is not toxic-appearing or diaphoretic  HENT:      Head: Normocephalic and atraumatic  Right Ear: Tympanic membrane, ear canal and external ear normal  There is no impacted cerumen  Left Ear: Tympanic membrane, ear canal and external ear normal  There is no impacted cerumen  Nose: Nose normal  No congestion or rhinorrhea  Mouth/Throat:      Mouth: Mucous membranes are moist       Pharynx: Oropharynx is clear  No oropharyngeal exudate or posterior oropharyngeal erythema  Eyes:      General: No scleral icterus  Right eye: No discharge  Left eye: No discharge  Conjunctiva/sclera: Conjunctivae normal       Pupils: Pupils are equal, round, and reactive to light     Neck: Thyroid: No thyromegaly  Cardiovascular:      Rate and Rhythm: Normal rate and regular rhythm  Pulses: Normal pulses  Heart sounds: Normal heart sounds  No murmur heard  No friction rub  Pulmonary:      Effort: Pulmonary effort is normal  No respiratory distress  Breath sounds: Normal breath sounds  No wheezing or rales  Chest:      Chest wall: No tenderness  Abdominal:      General: There is no distension  Palpations: Abdomen is soft  There is no mass  Tenderness: There is no abdominal tenderness  Hernia: No hernia is present  There is no hernia in the left inguinal area  Genitourinary:     Labia:         Right: No rash  Left: No rash  Vagina: No foreign body  No tenderness or bleeding  Cervix: No cervical motion tenderness  Adnexa:         Right: No mass or tenderness  Left: No mass or tenderness  Musculoskeletal:         General: Normal range of motion  Cervical back: Normal range of motion  No rigidity  Lymphadenopathy:      Cervical: No cervical adenopathy  Skin:     General: Skin is warm  Coloration: Skin is not pale  Findings: No erythema or rash  Neurological:      Mental Status: She is alert and oriented to person, place, and time  Sensory: No sensory deficit  Motor: No weakness or abnormal muscle tone        Gait: Gait normal       Deep Tendon Reflexes: Reflexes normal    Psychiatric:         Mood and Affect: Mood normal          Behavior: Behavior normal           Rao Canales MD   [address] [place] [address]

## 2022-08-01 NOTE — PATIENT INSTRUCTIONS

## 2022-08-02 PROBLEM — Z00.01 ENCOUNTER FOR WELL ADULT EXAM WITH ABNORMAL FINDINGS: Status: RESOLVED | Noted: 2019-05-08 | Resolved: 2022-08-02

## 2022-08-02 PROBLEM — Z00.01 ENCOUNTER FOR PREVENTATIVE ADULT HEALTH CARE EXAM WITH ABNORMAL FINDINGS: Status: ACTIVE | Noted: 2022-08-02

## 2022-08-02 PROBLEM — Z00.00 ANNUAL PHYSICAL EXAM: Status: ACTIVE | Noted: 2022-08-02

## 2022-08-02 PROBLEM — Z20.822 EXPOSURE TO COVID-19 VIRUS: Status: RESOLVED | Noted: 2021-12-16 | Resolved: 2022-08-02

## 2022-08-02 NOTE — ASSESSMENT & PLAN NOTE
A uncontrolled patient history of gastric bypass the recommend to start vitamin-D 20564 International Units once a week for 12 week then every other week

## 2022-08-26 ENCOUNTER — APPOINTMENT (OUTPATIENT)
Dept: LAB | Facility: CLINIC | Age: 42
End: 2022-08-26
Payer: COMMERCIAL

## 2022-08-26 ENCOUNTER — TRANSCRIBE ORDERS (OUTPATIENT)
Dept: LAB | Facility: CLINIC | Age: 42
End: 2022-08-26

## 2022-08-26 DIAGNOSIS — T85.44XA CAPSULAR CONTRACTURE OF BREAST IMPLANT, INITIAL ENCOUNTER: ICD-10-CM

## 2022-08-26 DIAGNOSIS — T85.44XA CAPSULAR CONTRACTURE OF BREAST IMPLANT, INITIAL ENCOUNTER: Primary | ICD-10-CM

## 2022-08-26 LAB
ALBUMIN SERPL BCP-MCNC: 3.2 G/DL (ref 3.5–5)
ALP SERPL-CCNC: 52 U/L (ref 46–116)
ALT SERPL W P-5'-P-CCNC: 22 U/L (ref 12–78)
AST SERPL W P-5'-P-CCNC: 12 U/L (ref 5–45)
BILIRUB DIRECT SERPL-MCNC: 0.11 MG/DL (ref 0–0.2)
BILIRUB SERPL-MCNC: 0.2 MG/DL (ref 0.2–1)
PROT SERPL-MCNC: 6.8 G/DL (ref 6.4–8.4)

## 2022-08-26 PROCEDURE — 36415 COLL VENOUS BLD VENIPUNCTURE: CPT

## 2022-08-26 PROCEDURE — 80076 HEPATIC FUNCTION PANEL: CPT

## 2022-10-05 ENCOUNTER — CLINICAL SUPPORT (OUTPATIENT)
Dept: FAMILY MEDICINE CLINIC | Facility: CLINIC | Age: 42
End: 2022-10-05
Payer: COMMERCIAL

## 2022-10-05 DIAGNOSIS — Z11.52 ENCOUNTER FOR SCREENING FOR COVID-19: Primary | ICD-10-CM

## 2022-10-05 PROCEDURE — 99211 OFF/OP EST MAY X REQ PHY/QHP: CPT | Performed by: FAMILY MEDICINE

## 2022-10-05 PROCEDURE — U0003 INFECTIOUS AGENT DETECTION BY NUCLEIC ACID (DNA OR RNA); SEVERE ACUTE RESPIRATORY SYNDROME CORONAVIRUS 2 (SARS-COV-2) (CORONAVIRUS DISEASE [COVID-19]), AMPLIFIED PROBE TECHNIQUE, MAKING USE OF HIGH THROUGHPUT TECHNOLOGIES AS DESCRIBED BY CMS-2020-01-R: HCPCS | Performed by: FAMILY MEDICINE

## 2022-10-05 PROCEDURE — U0005 INFEC AGEN DETEC AMPLI PROBE: HCPCS | Performed by: FAMILY MEDICINE

## 2022-10-06 ENCOUNTER — TELEPHONE (OUTPATIENT)
Dept: FAMILY MEDICINE CLINIC | Facility: CLINIC | Age: 42
End: 2022-10-06

## 2022-10-06 LAB — SARS-COV-2 RNA RESP QL NAA+PROBE: NEGATIVE

## 2022-10-31 DIAGNOSIS — E55.9 VITAMIN D DEFICIENCY: Primary | ICD-10-CM

## 2022-10-31 RX ORDER — ERGOCALCIFEROL 1.25 MG/1
50000 CAPSULE ORAL
Qty: 8 CAPSULE | Refills: 0 | Status: SHIPPED | OUTPATIENT
Start: 2022-10-31

## 2023-05-04 ENCOUNTER — APPOINTMENT (OUTPATIENT)
Dept: LAB | Facility: CLINIC | Age: 43
End: 2023-05-04

## 2023-05-04 ENCOUNTER — LAB (OUTPATIENT)
Dept: LAB | Facility: CLINIC | Age: 43
End: 2023-05-04

## 2023-05-04 DIAGNOSIS — D50.8 OTHER IRON DEFICIENCY ANEMIA: ICD-10-CM

## 2023-05-04 DIAGNOSIS — D51.3 OTHER DIETARY VITAMIN B12 DEFICIENCY ANEMIA: ICD-10-CM

## 2023-05-04 DIAGNOSIS — E55.9 VITAMIN D DEFICIENCY: ICD-10-CM

## 2023-05-04 DIAGNOSIS — D50.8 OTHER IRON DEFICIENCY ANEMIA: Primary | ICD-10-CM

## 2023-05-04 DIAGNOSIS — T85.44XA CAPSULAR CONTRACTURE OF BREAST IMPLANT, INITIAL ENCOUNTER: ICD-10-CM

## 2023-05-04 DIAGNOSIS — Z00.8 HEALTH EXAMINATION IN POPULATION SURVEY: ICD-10-CM

## 2023-05-04 DIAGNOSIS — Z98.84 STATUS POST BARIATRIC SURGERY: ICD-10-CM

## 2023-05-04 LAB
25(OH)D3 SERPL-MCNC: 24 NG/ML (ref 30–100)
ALBUMIN SERPL BCP-MCNC: 3.6 G/DL (ref 3.5–5)
ALP SERPL-CCNC: 49 U/L (ref 46–116)
ALT SERPL W P-5'-P-CCNC: 26 U/L (ref 12–78)
ANION GAP SERPL CALCULATED.3IONS-SCNC: 5 MMOL/L (ref 4–13)
AST SERPL W P-5'-P-CCNC: 29 U/L (ref 5–45)
BASOPHILS # BLD AUTO: 0.06 THOUSANDS/ÂΜL (ref 0–0.1)
BASOPHILS NFR BLD AUTO: 1 % (ref 0–1)
BILIRUB DIRECT SERPL-MCNC: 0.12 MG/DL (ref 0–0.2)
BILIRUB SERPL-MCNC: 0.31 MG/DL (ref 0.2–1)
BUN SERPL-MCNC: 13 MG/DL (ref 5–25)
CALCIUM SERPL-MCNC: 9.1 MG/DL (ref 8.3–10.1)
CHLORIDE SERPL-SCNC: 105 MMOL/L (ref 96–108)
CHOLEST SERPL-MCNC: 198 MG/DL
CO2 SERPL-SCNC: 26 MMOL/L (ref 21–32)
CREAT SERPL-MCNC: 0.73 MG/DL (ref 0.6–1.3)
EOSINOPHIL # BLD AUTO: 0.08 THOUSAND/ÂΜL (ref 0–0.61)
EOSINOPHIL NFR BLD AUTO: 1 % (ref 0–6)
ERYTHROCYTE [DISTWIDTH] IN BLOOD BY AUTOMATED COUNT: 16 % (ref 11.6–15.1)
FERRITIN SERPL-MCNC: 9 NG/ML (ref 8–388)
GFR SERPL CREATININE-BSD FRML MDRD: 103 ML/MIN/1.73SQ M
GLUCOSE P FAST SERPL-MCNC: 83 MG/DL (ref 65–99)
HCT VFR BLD AUTO: 39.1 % (ref 34.8–46.1)
HDLC SERPL-MCNC: 119 MG/DL
HGB BLD-MCNC: 12.1 G/DL (ref 11.5–15.4)
IMM GRANULOCYTES # BLD AUTO: 0.01 THOUSAND/UL (ref 0–0.2)
IMM GRANULOCYTES NFR BLD AUTO: 0 % (ref 0–2)
IRON SERPL-MCNC: 22 UG/DL (ref 50–170)
LDLC SERPL CALC-MCNC: 68 MG/DL (ref 0–100)
LYMPHOCYTES # BLD AUTO: 1.84 THOUSANDS/ÂΜL (ref 0.6–4.47)
LYMPHOCYTES NFR BLD AUTO: 32 % (ref 14–44)
MCH RBC QN AUTO: 27.8 PG (ref 26.8–34.3)
MCHC RBC AUTO-ENTMCNC: 30.9 G/DL (ref 31.4–37.4)
MCV RBC AUTO: 90 FL (ref 82–98)
MONOCYTES # BLD AUTO: 0.59 THOUSAND/ÂΜL (ref 0.17–1.22)
MONOCYTES NFR BLD AUTO: 10 % (ref 4–12)
NEUTROPHILS # BLD AUTO: 3.16 THOUSANDS/ÂΜL (ref 1.85–7.62)
NEUTS SEG NFR BLD AUTO: 56 % (ref 43–75)
NONHDLC SERPL-MCNC: 79 MG/DL
NRBC BLD AUTO-RTO: 0 /100 WBCS
PLATELET # BLD AUTO: 315 THOUSANDS/UL (ref 149–390)
PMV BLD AUTO: 10.3 FL (ref 8.9–12.7)
POTASSIUM SERPL-SCNC: 4.3 MMOL/L (ref 3.5–5.3)
PROT SERPL-MCNC: 6.9 G/DL (ref 6.4–8.4)
RBC # BLD AUTO: 4.35 MILLION/UL (ref 3.81–5.12)
SODIUM SERPL-SCNC: 136 MMOL/L (ref 135–147)
TIBC SERPL-MCNC: 399 UG/DL (ref 250–450)
TRIGL SERPL-MCNC: 57 MG/DL
TSH SERPL DL<=0.05 MIU/L-ACNC: 1.39 UIU/ML (ref 0.45–4.5)
VIT B12 SERPL-MCNC: 492 PG/ML (ref 100–900)
WBC # BLD AUTO: 5.74 THOUSAND/UL (ref 4.31–10.16)

## 2023-05-05 ENCOUNTER — TELEPHONE (OUTPATIENT)
Dept: FAMILY MEDICINE CLINIC | Facility: CLINIC | Age: 43
End: 2023-05-05

## 2023-05-05 DIAGNOSIS — E55.9 VITAMIN D DEFICIENCY: Primary | ICD-10-CM

## 2023-05-05 DIAGNOSIS — D50.8 OTHER IRON DEFICIENCY ANEMIA: ICD-10-CM

## 2023-05-05 DIAGNOSIS — Z98.84 STATUS POST BARIATRIC SURGERY: Primary | ICD-10-CM

## 2023-05-05 RX ORDER — SODIUM CHLORIDE 9 MG/ML
20 INJECTION, SOLUTION INTRAVENOUS ONCE
OUTPATIENT
Start: 2023-05-05

## 2023-05-05 RX ORDER — MULTIVIT-MIN/IRON/FOLIC ACID/K 18-600-40
1 CAPSULE ORAL DAILY
Qty: 90 CAPSULE | Refills: 0 | Status: SHIPPED | OUTPATIENT
Start: 2023-05-05

## 2023-05-05 NOTE — RESULT ENCOUNTER NOTE
Patient iron level is low she had history of gastric bypass recommend to get iron infusion vitamin D is low to send to the pharmacy 2000 IU once a day

## 2023-05-05 NOTE — TELEPHONE ENCOUNTER
----- Message from Jabier Magallon MD sent at 5/5/2023  8:43 AM EDT -----  Patient iron level is low she had history of gastric bypass recommend to get iron infusion vitamin D is low to send to the pharmacy 2000 IU once a day

## 2023-05-06 LAB
EST. AVERAGE GLUCOSE BLD GHB EST-MCNC: 100 MG/DL
HBA1C MFR BLD: 5.1 %

## 2023-05-09 ENCOUNTER — HOSPITAL ENCOUNTER (OUTPATIENT)
Dept: INFUSION CENTER | Facility: CLINIC | Age: 43
Discharge: HOME | End: 2023-05-09

## 2023-05-09 VITALS
RESPIRATION RATE: 18 BRPM | HEART RATE: 74 BPM | DIASTOLIC BLOOD PRESSURE: 67 MMHG | TEMPERATURE: 98 F | SYSTOLIC BLOOD PRESSURE: 127 MMHG

## 2023-05-09 DIAGNOSIS — D50.8 OTHER IRON DEFICIENCY ANEMIA: ICD-10-CM

## 2023-05-09 DIAGNOSIS — Z98.84 STATUS POST BARIATRIC SURGERY: Primary | ICD-10-CM

## 2023-05-09 LAB — VIT B1 BLD-SCNC: 93.5 NMOL/L (ref 66.5–200)

## 2023-05-09 RX ORDER — SODIUM CHLORIDE 9 MG/ML
20 INJECTION, SOLUTION INTRAVENOUS ONCE
Status: CANCELLED | OUTPATIENT
Start: 2023-05-10

## 2023-05-09 RX ORDER — SODIUM CHLORIDE 9 MG/ML
20 INJECTION, SOLUTION INTRAVENOUS ONCE
Status: DISCONTINUED | OUTPATIENT
Start: 2023-05-09 | End: 2023-05-12 | Stop reason: HOSPADM

## 2023-05-09 RX ORDER — SODIUM CHLORIDE 9 MG/ML
20 INJECTION, SOLUTION INTRAVENOUS ONCE
Status: COMPLETED | OUTPATIENT
Start: 2023-05-09 | End: 2023-05-09

## 2023-05-09 RX ADMIN — SODIUM CHLORIDE 20 ML/HR: 0.9 INJECTION, SOLUTION INTRAVENOUS at 13:45

## 2023-05-09 RX ADMIN — SODIUM CHLORIDE 200 MG: 9 INJECTION, SOLUTION INTRAVENOUS at 13:44

## 2023-05-15 ENCOUNTER — TELEPHONE (OUTPATIENT)
Dept: HEMATOLOGY ONCOLOGY | Facility: CLINIC | Age: 43
End: 2023-05-15

## 2023-05-18 ENCOUNTER — HOSPITAL ENCOUNTER (OUTPATIENT)
Dept: INFUSION CENTER | Facility: CLINIC | Age: 43
Discharge: HOME | End: 2023-05-18

## 2023-05-18 VITALS
TEMPERATURE: 98.1 F | RESPIRATION RATE: 18 BRPM | DIASTOLIC BLOOD PRESSURE: 82 MMHG | SYSTOLIC BLOOD PRESSURE: 127 MMHG | HEART RATE: 83 BPM

## 2023-05-18 DIAGNOSIS — D50.8 OTHER IRON DEFICIENCY ANEMIA: ICD-10-CM

## 2023-05-18 DIAGNOSIS — Z98.84 STATUS POST BARIATRIC SURGERY: Primary | ICD-10-CM

## 2023-05-18 RX ORDER — SODIUM CHLORIDE 9 MG/ML
20 INJECTION, SOLUTION INTRAVENOUS ONCE
Status: CANCELLED | OUTPATIENT
Start: 2023-05-19

## 2023-05-18 RX ORDER — SODIUM CHLORIDE 9 MG/ML
20 INJECTION, SOLUTION INTRAVENOUS ONCE
Status: CANCELLED | OUTPATIENT
Start: 2023-05-25

## 2023-05-18 RX ORDER — SODIUM CHLORIDE 9 MG/ML
20 INJECTION, SOLUTION INTRAVENOUS ONCE
Status: COMPLETED | OUTPATIENT
Start: 2023-05-18 | End: 2023-05-18

## 2023-05-18 RX ADMIN — SODIUM CHLORIDE 20 ML/HR: 0.9 INJECTION, SOLUTION INTRAVENOUS at 08:39

## 2023-05-18 RX ADMIN — IRON SUCROSE 200 MG: 20 INJECTION, SOLUTION INTRAVENOUS at 08:40

## 2023-05-18 NOTE — PROGRESS NOTES
Pt presents for venofer 200m  No new meds or concerns  Pt tolerated infusion without adverse reaction  Future appointments discussed  AVS declined

## 2023-05-25 ENCOUNTER — HOSPITAL ENCOUNTER (OUTPATIENT)
Dept: INFUSION CENTER | Facility: CLINIC | Age: 43
Discharge: HOME | End: 2023-05-25

## 2023-05-25 VITALS
SYSTOLIC BLOOD PRESSURE: 124 MMHG | HEART RATE: 76 BPM | RESPIRATION RATE: 16 BRPM | DIASTOLIC BLOOD PRESSURE: 78 MMHG | TEMPERATURE: 97.7 F

## 2023-05-25 DIAGNOSIS — D50.8 OTHER IRON DEFICIENCY ANEMIA: ICD-10-CM

## 2023-05-25 DIAGNOSIS — Z98.84 STATUS POST BARIATRIC SURGERY: Primary | ICD-10-CM

## 2023-05-25 RX ORDER — SODIUM CHLORIDE 9 MG/ML
20 INJECTION, SOLUTION INTRAVENOUS ONCE
OUTPATIENT
Start: 2023-05-26

## 2023-05-25 RX ORDER — SODIUM CHLORIDE 9 MG/ML
20 INJECTION, SOLUTION INTRAVENOUS ONCE
Status: COMPLETED | OUTPATIENT
Start: 2023-05-25 | End: 2023-05-25

## 2023-05-25 RX ADMIN — SODIUM CHLORIDE 200 MG: 9 INJECTION, SOLUTION INTRAVENOUS at 08:38

## 2023-05-25 RX ADMIN — SODIUM CHLORIDE 20 ML/HR: 0.9 INJECTION, SOLUTION INTRAVENOUS at 08:25

## 2023-05-25 NOTE — PROGRESS NOTES
Pt  Tolerated Venofer without adverse event  Pt  States she will have only 3 at this time following up with ordering physician  If pt  Requires additional treatment, future appts to be scheduled

## 2023-05-25 NOTE — PLAN OF CARE
Problem: INFECTION - ADULT  Goal: Absence or prevention of progression during hospitalization  Description: INTERVENTIONS:  - Assess and monitor for signs and symptoms of infection  - Monitor lab/diagnostic results  - Monitor all insertion sites, i e  indwelling lines, tubes, and drains  - Monitor endotracheal if appropriate and nasal secretions for changes in amount and color  - [place] appropriate cooling/warming therapies per order  - Administer medications as ordered  - Instruct and encourage patient and family to use good hand hygiene technique  - Identify and instruct in appropriate isolation precautions for identified infection/condition  Outcome: Progressing  Goal: Absence of fever/infection during neutropenic period  Description: INTERVENTIONS:  - Monitor WBC    Outcome: Progressing     Problem: Knowledge Deficit  Goal: Patient/family/caregiver demonstrates understanding of disease process, treatment plan, medications, and discharge instructions  Description: Complete learning assessment and assess knowledge base    Interventions:  - Provide teaching at level of understanding  - Provide teaching via preferred learning methods  Outcome: Progressing

## 2023-09-13 ENCOUNTER — OFFICE VISIT (OUTPATIENT)
Dept: FAMILY MEDICINE CLINIC | Facility: CLINIC | Age: 43
End: 2023-09-13
Payer: COMMERCIAL

## 2023-09-13 VITALS
SYSTOLIC BLOOD PRESSURE: 110 MMHG | WEIGHT: 137 LBS | DIASTOLIC BLOOD PRESSURE: 80 MMHG | BODY MASS INDEX: 24.27 KG/M2 | OXYGEN SATURATION: 99 % | HEIGHT: 63 IN | TEMPERATURE: 97.2 F | HEART RATE: 82 BPM

## 2023-09-13 DIAGNOSIS — Z12.31 ENCOUNTER FOR SCREENING MAMMOGRAM FOR MALIGNANT NEOPLASM OF BREAST: ICD-10-CM

## 2023-09-13 DIAGNOSIS — Z00.01 ENCOUNTER FOR PREVENTATIVE ADULT HEALTH CARE EXAM WITH ABNORMAL FINDINGS: Primary | ICD-10-CM

## 2023-09-13 DIAGNOSIS — Z12.4 CERVICAL CANCER SCREENING: ICD-10-CM

## 2023-09-13 PROBLEM — R51.9 NONINTRACTABLE HEADACHE: Status: RESOLVED | Noted: 2022-01-28 | Resolved: 2023-09-13

## 2023-09-13 PROCEDURE — 99396 PREV VISIT EST AGE 40-64: CPT | Performed by: FAMILY MEDICINE

## 2023-09-13 RX ORDER — CYANOCOBALAMIN 1000 UG/ML
1000 INJECTION, SOLUTION INTRAMUSCULAR; SUBCUTANEOUS
COMMUNITY

## 2023-09-13 NOTE — PROGRESS NOTES
[address] [facility] PRIMARY CARE [facility]    NAME: Aleisha Jennings  AGE: 38 y.o. SEX: female  : 1980     DATE: 2023     Assessment and Plan:     Problem List Items Addressed This Visit        Other    Encounter for preventative adult health care exam with abnormal findings - Primary      advice and education were given regarding nutrition, aerobic exercises, weight-bearing exercises, cardiovascular risk reduction, fall risk reduction, and age-appropriate supplements. The patient was counseled regarding instructions for management, risk factor reductions, prognosis, risks and benefits of treatment options, patient and family education, and importance of compliance with treatment. Discussed flu shot with the patient that she would wait in October also discussed cervical cancer screening patient will call for GYN follow-up appointment        Other Visit Diagnoses     Cervical cancer screening        patient will call her GYN    Encounter for screening mammogram for malignant neoplasm of breast        Recommended mammogram screening for breast cancer    Relevant Orders    Mammo screening bilateral w 3d & cad          Immunizations and preventive care screenings were discussed with patient today. Appropriate education was printed on patient's after visit summary. Counseling:  Alcohol/drug use: discussed moderation in alcohol intake, the recommendations for healthy alcohol use, and avoidance of illicit drug use. Dental Health: discussed importance of regular tooth brushing, flossing, and dental visits. Injury prevention: discussed safety/seat belts, safety helmets, smoke detectors, carbon dioxide detectors, and smoking near bedding or upholstery. Sexual health: discussed sexually transmitted diseases, partner selection, use of condoms, avoidance of unintended pregnancy, and contraceptive alternatives.   Exercise: the importance of regular exercise/physical activity was discussed. Recommend exercise 3-5 times per week for at least 30 minutes. Depression Screening and Follow-up Plan: Patient was screened for depression during today's encounter. They screened negative with a PHQ-2 score of 0. Return in about 1 year (around 9/13/2024). Chief Complaint:     Chief Complaint   Patient presents with   • Physical Exam      History of Present Illness:     Adult Annual Physical   Patient here for a comprehensive physical exam. The patient reports no problems. Diet and Physical Activity  Diet/Nutrition: Intermittent fasting. Exercise: 3-4 times a week on average. Depression Screening  PHQ-2/9 Depression Screening    Little interest or pleasure in doing things: 0 - not at all  Feeling down, depressed, or hopeless: 0 - not at all  PHQ-2 Score: 0  PHQ-2 Interpretation: Negative depression screen       General Health  Sleep: gets 4-6 hours of sleep on average. Hearing: normal - bilateral.  Vision: no vision problems. Dental: regular dental visits. /GYN Health  Patient is: premenopausal  Contraceptive method: tubal ligation. Review of Systems:     Review of Systems   Constitutional: Negative for chills and fever. HENT: Negative for ear pain, sinus pressure, sinus pain and sore throat. Eyes: Negative for pain and visual disturbance. Respiratory: Negative for cough and shortness of breath. Cardiovascular: Negative for chest pain and palpitations. Gastrointestinal: Negative for abdominal pain, constipation, diarrhea and vomiting. Genitourinary: Negative for dysuria and hematuria. Musculoskeletal: Negative for arthralgias and back pain. Skin: Negative for color change and rash. Neurological: Negative for seizures and syncope. Hematological: Does not bruise/bleed easily. Psychiatric/Behavioral: Negative for behavioral problems. All other systems reviewed and are negative.      Past Medical History:     Past Medical History:   Diagnosis Date   • Anemia     vit B12 def   • Encounter for well adult exam with abnormal findings 2019   • Exposure to COVID-19 virus 2021   • History of transfusion     iron infusions   • Intertrigo 2019   • Nonintractable headache 2022   • Overweight with body mass index (BMI) of 26 to 26.9 in adult 2019   • Tinea corporis 2019   • Tobacco abuse    • Vitamin D deficiency       Past Surgical History:     Past Surgical History:   Procedure Laterality Date   • GASTRIC BYPASS     • PANNICULECTOMY N/A 2019    Procedure: PANNICULECTOMY;  Surgeon: Susanne Nation MD;  Location: [facility];  Service: Plastics   • MD BREAST AUGMENTATION WITH IMPLANT Left 10/30/2020    Procedure: IMPLANT EXCHANGE;  Surgeon: Francisca Lees MD;  Location: [address] [place] [address] MAIN OR;  Service: Plastics   • MD MASTOPEXY Right 10/30/2020    Procedure: MASTOPEXY BREAST WITH AUGMENTATION;  Surgeon: Francisca Lees MD;  Location: [address] [place] [address] MAIN OR;  Service: Plastics   • MD ISAI-IMPLANT CAPSULECTOMY BREAST COMPLETE Left 10/30/2020    Procedure: CAPSULECTOMY;  Surgeon: Francisca Lees MD;  Location: [address] [place] [address] MAIN OR;  Service: Plastics   • REDUCTION MAMMAPLASTY     • TUBAL LIGATION        Social History:     Social History     Socioeconomic History   • Marital status: /Civil Union     Spouse name: None   • Number of children: None   • Years of education: None   • Highest education level: None   Occupational History   • None   Tobacco Use   • Smoking status: Former     Packs/day: 0.50     Years: 15.00     Total pack years: 7.50     Types: Cigarettes     Start date: 2004     Quit date: 3/12/2019     Years since quittin.5   • Smokeless tobacco: Never   • Tobacco comments:     quite smoking   Vaping Use   • Vaping Use: Never used   Substance and Sexual Activity   • Alcohol use:  Yes   • Drug use: Never   • Sexual activity: Yes     Partners: Male     Birth control/protection: Female Sterilization   Other Topics Concern   • None   Social History Narrative    Children: Yes    Hand dominance: right     Social Determinants of Health     Financial Resource Strain: Low Risk  (7/31/2020)    Overall Financial Resource Strain (CARDIA)    • Difficulty of Paying Living Expenses: Not hard at all   Food Insecurity: No Food Insecurity (7/31/2020)    Hunger Vital Sign    • Worried About Running Out of Food in the Last Year: Never true    • Ran Out of Food in the Last Year: Never true   Transportation Needs: No Transportation Needs (7/31/2020)    PRAPARE - Transportation    • Lack of Transportation (Medical): No    • Lack of Transportation (Non-Medical): No   Physical Activity: Insufficiently Active (7/31/2020)    Exercise Vital Sign    • Days of Exercise per Week: 3 days    • Minutes of Exercise per Session: 30 min   Stress: No Stress Concern Present (7/31/2020)    [facility]    • Feeling of Stress : Not at all   Social Connections:  Moderately Integrated (7/31/2020)    Social Connection and Isolation Panel [NHANES]    • Frequency of Communication with Friends and Family: More than three times a week    • Frequency of Social Gatherings with Friends and Family: More than three times a week    • Attends [employer] Services: More than 4 times per year    • Active Member of Clubs or Organizations: No    • Attends Club or Organization Meetings: Never    • Marital Status:    Intimate Partner Violence: Not At Risk (7/31/2020)    Humiliation, Afraid, Rape, and Kick questionnaire    • Fear of Current or Ex-Partner: No    • Emotionally Abused: No    • Physically Abused: No    • Sexually Abused: No   Housing Stability: Not on file      Family History:     Family History   Problem Relation Age of Onset   • Hypertension Family    • Diabetes Family         Mellitus   • Diabetes Mother    • Arthritis Mother    • Diabetes Father    • COPD Father    • Asthma Sister    • Allergies Sister       Current Medications:     Current Outpatient Medications   Medication Sig Dispense Refill   • cyanocobalamin 1,000 mcg/mL Inject 1,000 mcg into a muscle every 30 (thirty) days     • ergocalciferol (VITAMIN D2) 50,000 units Take 1 capsule (50,000 Units total) by mouth every 14 (fourteen) days Please delivery by  to [id number] [facility] # [id number] 8 capsule 0   • zafirlukast (ACCOLATE) 20 MG tablet Take 20 mg by mouth 2 (two) times a day       No current facility-administered medications for this visit. Allergies:     No Known Allergies   Physical Exam:     /80 (BP Location: Left arm, Patient Position: Sitting)   Pulse 82   Temp (!) 97.2 °F (36.2 °C) (Tympanic)   Ht 5' 2.5" (1.588 m)   Wt 62.1 kg (137 lb)   LMP 08/12/2023 (Exact Date)   SpO2 99%   Breastfeeding No   BMI 24.66 kg/m²     Physical Exam  Vitals and nursing note reviewed. Constitutional:       General: She is not in acute distress. Appearance: She is well-developed. HENT:      Head: Normocephalic and atraumatic. Right Ear: Tympanic membrane normal. There is no impacted cerumen. Left Ear: Tympanic membrane normal. There is no impacted cerumen. Nose: No rhinorrhea. Mouth/Throat:      Pharynx: No posterior oropharyngeal erythema. Eyes:      General:         Right eye: No discharge. Left eye: No discharge. Conjunctiva/sclera: Conjunctivae normal.   Cardiovascular:      Rate and Rhythm: Normal rate and regular rhythm. Heart sounds: No murmur heard. Pulmonary:      Effort: Pulmonary effort is normal. No respiratory distress. Breath sounds: Normal breath sounds. Abdominal:      Palpations: Abdomen is soft. Tenderness: There is no abdominal tenderness. Musculoskeletal:         General: No swelling. Cervical back: Neck supple. Right lower leg: No edema.       Left lower leg: No edema.   Skin:     General: Skin is warm and dry. Capillary Refill: Capillary refill takes less than 2 seconds. Findings: No erythema or rash. Neurological:      Mental Status: She is alert and oriented to person, place, and time.    Psychiatric:         Mood and Affect: Mood normal.          Isabel Salcedo MD  [address]

## 2023-09-13 NOTE — ASSESSMENT & PLAN NOTE
advice and education were given regarding nutrition, aerobic exercises, weight-bearing exercises, cardiovascular risk reduction, fall risk reduction, and age-appropriate supplements. The patient was counseled regarding instructions for management, risk factor reductions, prognosis, risks and benefits of treatment options, patient and family education, and importance of compliance with treatment.    Discussed flu shot with the patient that she would wait in October also discussed cervical cancer screening patient will call for GYN follow-up appointment

## 2023-10-25 DIAGNOSIS — D50.8 OTHER IRON DEFICIENCY ANEMIA: Primary | ICD-10-CM

## 2023-10-26 ENCOUNTER — APPOINTMENT (OUTPATIENT)
Dept: LAB | Facility: CLINIC | Age: 43
End: 2023-10-26
Payer: COMMERCIAL

## 2023-10-26 DIAGNOSIS — D50.8 OTHER IRON DEFICIENCY ANEMIA: ICD-10-CM

## 2023-10-26 LAB
BASOPHILS # BLD AUTO: 0.06 THOUSANDS/ÂΜL (ref 0–0.1)
BASOPHILS NFR BLD AUTO: 1 % (ref 0–1)
EOSINOPHIL # BLD AUTO: 0.09 THOUSAND/ÂΜL (ref 0–0.61)
EOSINOPHIL NFR BLD AUTO: 1 % (ref 0–6)
ERYTHROCYTE [DISTWIDTH] IN BLOOD BY AUTOMATED COUNT: 13.2 % (ref 11.6–15.1)
FERRITIN SERPL-MCNC: 17 NG/ML (ref 11–307)
HCT VFR BLD AUTO: 42.1 % (ref 34.8–46.1)
HGB BLD-MCNC: 14.3 G/DL (ref 11.5–15.4)
IMM GRANULOCYTES # BLD AUTO: 0.03 THOUSAND/UL (ref 0–0.2)
IMM GRANULOCYTES NFR BLD AUTO: 0 % (ref 0–2)
IRON SERPL-MCNC: 200 UG/DL (ref 50–212)
LYMPHOCYTES # BLD AUTO: 1.55 THOUSANDS/ÂΜL (ref 0.6–4.47)
LYMPHOCYTES NFR BLD AUTO: 21 % (ref 14–44)
MCH RBC QN AUTO: 33 PG (ref 26.8–34.3)
MCHC RBC AUTO-ENTMCNC: 34 G/DL (ref 31.4–37.4)
MCV RBC AUTO: 97 FL (ref 82–98)
MONOCYTES # BLD AUTO: 0.55 THOUSAND/ÂΜL (ref 0.17–1.22)
MONOCYTES NFR BLD AUTO: 8 % (ref 4–12)
NEUTROPHILS # BLD AUTO: 5 THOUSANDS/ÂΜL (ref 1.85–7.62)
NEUTS SEG NFR BLD AUTO: 69 % (ref 43–75)
NRBC BLD AUTO-RTO: 0 /100 WBCS
PLATELET # BLD AUTO: 313 THOUSANDS/UL (ref 149–390)
PMV BLD AUTO: 9.7 FL (ref 8.9–12.7)
RBC # BLD AUTO: 4.33 MILLION/UL (ref 3.81–5.12)
WBC # BLD AUTO: 7.28 THOUSAND/UL (ref 4.31–10.16)

## 2023-10-26 PROCEDURE — 83540 ASSAY OF IRON: CPT

## 2023-10-26 PROCEDURE — 82728 ASSAY OF FERRITIN: CPT

## 2023-10-26 PROCEDURE — 85025 COMPLETE CBC W/AUTO DIFF WBC: CPT

## 2023-10-26 PROCEDURE — 36415 COLL VENOUS BLD VENIPUNCTURE: CPT

## 2024-01-13 ENCOUNTER — OFFICE VISIT (OUTPATIENT)
Dept: URGENT CARE | Facility: MEDICAL CENTER | Age: 44
End: 2024-01-13
Payer: COMMERCIAL

## 2024-01-13 VITALS
SYSTOLIC BLOOD PRESSURE: 134 MMHG | TEMPERATURE: 96.2 F | RESPIRATION RATE: 18 BRPM | OXYGEN SATURATION: 99 % | HEART RATE: 71 BPM | DIASTOLIC BLOOD PRESSURE: 81 MMHG

## 2024-01-13 DIAGNOSIS — S91.111A: Primary | ICD-10-CM

## 2024-01-13 DIAGNOSIS — Z23 ENCOUNTER FOR IMMUNIZATION: ICD-10-CM

## 2024-01-13 PROCEDURE — 90715 TDAP VACCINE 7 YRS/> IM: CPT

## 2024-01-13 PROCEDURE — 99213 OFFICE O/P EST LOW 20 MIN: CPT | Performed by: ORTHOPAEDIC SURGERY

## 2024-01-13 PROCEDURE — 12001 RPR S/N/AX/GEN/TRNK 2.5CM/<: CPT | Performed by: ORTHOPAEDIC SURGERY

## 2024-01-13 PROCEDURE — 90471 IMMUNIZATION ADMIN: CPT | Performed by: ORTHOPAEDIC SURGERY

## 2024-01-13 PROCEDURE — 12032 INTMD RPR S/A/T/EXT 2.6-7.5: CPT | Performed by: ORTHOPAEDIC SURGERY

## 2024-01-14 NOTE — PROGRESS NOTES
[facility]        NAME: Yahaira Cerrato is a 43 y.o. female  : 1980    MRN: [id number]  DATE: 2024  TIME: 7:42 PM    Assessment and Plan   Laceration of right great toe w/o foreign body w/o damage to nail, initial encounter [S91.111A]  1. Laceration of right great toe w/o foreign body w/o damage to nail, initial encounter  Laceration repair      2. Encounter for immunization  Tdap Vaccine greater than or equal to 6yo        Laceration cleansed and then repaired with glue as described below. Patient instructed on proper wound care.    Patient Instructions     Keep wound clean, dry, and covered  Follow up with PCP in 3-5 days.  Proceed to  ER if symptoms worsen.    Chief Complaint     Chief Complaint   Patient presents with    Laceration     Patient c/o a laceration to her right 3rd toe.         History of Present Illness       43 YOF presents to the urgent care for evaluation of a laceration to the bottom of her right third toe. The patient states prior to arrival she tripped and sliced the toe off of a broken laundry basket. There was bleeding, though controlled with pressure. The patient admits that the toe is a bit numb.         Review of Systems   Review of Systems   Constitutional:  Negative for chills and fever.   HENT:  Negative for ear pain and sore throat.    Eyes:  Negative for pain and visual disturbance.   Respiratory:  Negative for cough and shortness of breath.    Cardiovascular:  Negative for chest pain and palpitations.   Gastrointestinal:  Negative for abdominal pain and vomiting.   Genitourinary:  Negative for dysuria and hematuria.   Musculoskeletal:  Negative for arthralgias and back pain.   Skin:  Positive for wound. Negative for color change and rash.   Neurological:  Negative for seizures and syncope.   All other systems reviewed and are negative.        Current Medications       Current Outpatient Medications:     cyanocobalamin 1,000 mcg/mL, Inject 1,000 mcg  into a muscle every 30 (thirty) days, Disp: , Rfl:     ergocalciferol (VITAMIN D2) 50,000 units, Take 1 capsule (50,000 Units total) by mouth every 14 (fourteen) days Please delivery by  to [address] [facility] [address] [place] [id number] [employer] [facility]ge # [id number], Disp: 8 capsule, Rfl: 0    zafirlukast (ACCOLATE) 20 MG tablet, Take 20 mg by mouth 2 (two) times a day, Disp: , Rfl:     Current Allergies     Allergies as of 01/13/2024    (No Known Allergies)            The following portions of the patient's history were reviewed and updated as appropriate: allergies, current medications, past family history, past medical history, past social history, past surgical history and problem list.     Past Medical History:   Diagnosis Date    Anemia     vit B12 def    Encounter for well adult exam with abnormal findings 5/8/2019    Exposure to COVID-19 virus 12/16/2021    History of transfusion     iron infusions    Intertrigo 6/13/2019    Nonintractable headache 1/28/2022    Overweight with body mass index (BMI) of 26 to 26.9 in adult 5/8/2019    Tinea corporis 5/8/2019    Tobacco abuse     Vitamin D deficiency        Past Surgical History:   Procedure Laterality Date    GASTRIC BYPASS  2008    PANNICULECTOMY N/A 8/19/2019    Procedure: PANNICULECTOMY;  Surgeon: Owen Velazquez MD;  Location:  Main OR;  Service: Plastics    NE BREAST AUGMENTATION WITH IMPLANT Left 10/30/2020    Procedure: IMPLANT EXCHANGE;  Surgeon: Dayrl Nuñez MD;  Location:  MAIN OR;  Service: Plastics    NE MASTOPEXY Right 10/30/2020    Procedure: MASTOPEXY BREAST WITH AUGMENTATION;  Surgeon: Daryl Nuñez MD;  Location:  MAIN OR;  Service: Plastics    NE ISAI-IMPLANT CAPSULECTOMY BREAST COMPLETE Left 10/30/2020    Procedure: CAPSULECTOMY;  Surgeon: Daryl Nuñez MD;  Location:  MAIN OR;  Service: Plastics    REDUCTION MAMMAPLASTY  1994    TUBAL LIGATION         Family History   Problem Relation Age of Onset    Hypertension  "Family     Diabetes Family         Mellitus    Diabetes Mother     Arthritis Mother     Diabetes Father     COPD Father     Asthma Sister     Allergies Sister          Medications have been verified.        Objective   /81   Pulse 71   Temp (!) 96.2 °F (35.7 °C)   Resp 18   SpO2 99%        Physical Exam     Physical Exam  Vitals and nursing note reviewed.   Constitutional:       Appearance: Normal appearance.   HENT:      Head: Normocephalic and atraumatic.      Nose: Nose normal.      Mouth/Throat:      Pharynx: Oropharynx is clear.   Eyes:      Extraocular Movements: Extraocular movements intact.      Pupils: Pupils are equal, round, and reactive to light.   Cardiovascular:      Rate and Rhythm: Normal rate and regular rhythm.      Pulses: Normal pulses.   Pulmonary:      Effort: Pulmonary effort is normal. No respiratory distress.      Breath sounds: Normal breath sounds.   Abdominal:      Palpations: Abdomen is soft.   Musculoskeletal:      Cervical back: Normal range of motion.        Feet:    Feet:      Comments: Along the base of the right third toe there is a laceration running horizontally along the skin fold. There is gaping present if the toe is straightened from it's neutral position. No active bleeding. Sensation intact to light touch throughout. Brisk cap refill.  Skin:     General: Skin is warm and dry.      Capillary Refill: Capillary refill takes less than 2 seconds.   Neurological:      General: No focal deficit present.      Mental Status: She is alert and oriented to person, place, and time.   Psychiatric:         Mood and Affect: Mood normal.         Behavior: Behavior normal.         Universal Protocol:  Consent: Verbal consent obtained.  Risks and benefits: risks, benefits and alternatives were discussed  Consent given by: patient  Time out: Immediately prior to procedure a \"time out\" was called to verify the correct patient, procedure, equipment, support staff and site/side marked " as required.  Timeout called at: 1/13/2024 4:00 PM.  Patient understanding: patient states understanding of the procedure being performed  Patient identity confirmed: verbally with patient  Laceration repair    Date/Time: 1/13/2024 4:00 PM    Performed by: Gisella Raymond PA-C  Authorized by: Gisella Raymond PA-C  Body area: lower extremity  Location details: right third toe  Laceration length: 1 cm  Foreign bodies: no foreign bodies  Tendon involvement: none  Nerve involvement: none  Vascular damage: no      Procedure Details:  Irrigation solution: saline  Irrigation method: syringe  Amount of cleaning: standard  Skin closure: glue  Approximation difficulty: simple  Dressing: gauze roll  Patient tolerance: patient tolerated the procedure well with no immediate complications

## 2024-02-16 DIAGNOSIS — D50.8 OTHER IRON DEFICIENCY ANEMIA: Primary | ICD-10-CM

## 2024-04-25 ENCOUNTER — LAB (OUTPATIENT)
Dept: LAB | Facility: CLINIC | Age: 44
End: 2024-04-25
Payer: COMMERCIAL

## 2024-04-25 DIAGNOSIS — D50.8 OTHER IRON DEFICIENCY ANEMIA: ICD-10-CM

## 2024-04-25 DIAGNOSIS — Z98.84 STATUS POST BARIATRIC SURGERY: Primary | ICD-10-CM

## 2024-04-25 DIAGNOSIS — Z83.3 FAMILY HISTORY OF DIET-CONTROLLED DIABETES: ICD-10-CM

## 2024-04-25 DIAGNOSIS — Z13.6 SCREENING FOR CARDIOVASCULAR CONDITION: ICD-10-CM

## 2024-04-25 DIAGNOSIS — Z13.220 SCREENING, LIPID: ICD-10-CM

## 2024-04-25 DIAGNOSIS — Z13.29 SCREENING FOR THYROID DISORDER: ICD-10-CM

## 2024-04-25 LAB
ALBUMIN SERPL BCP-MCNC: 3.7 G/DL (ref 3.5–5)
ALP SERPL-CCNC: 59 U/L (ref 34–104)
ALT SERPL W P-5'-P-CCNC: 14 U/L (ref 7–52)
ANION GAP SERPL CALCULATED.3IONS-SCNC: 6 MMOL/L (ref 4–13)
AST SERPL W P-5'-P-CCNC: 16 U/L (ref 13–39)
BASOPHILS # BLD AUTO: 0.06 THOUSANDS/ÂΜL (ref 0–0.1)
BASOPHILS NFR BLD AUTO: 1 % (ref 0–1)
BILIRUB SERPL-MCNC: 0.42 MG/DL (ref 0.2–1)
BUN SERPL-MCNC: 21 MG/DL (ref 5–25)
CALCIUM SERPL-MCNC: 8.5 MG/DL (ref 8.4–10.2)
CHLORIDE SERPL-SCNC: 104 MMOL/L (ref 96–108)
CHOLEST SERPL-MCNC: 179 MG/DL
CO2 SERPL-SCNC: 28 MMOL/L (ref 21–32)
CREAT SERPL-MCNC: 1.35 MG/DL (ref 0.6–1.3)
EOSINOPHIL # BLD AUTO: 0.12 THOUSAND/ÂΜL (ref 0–0.61)
EOSINOPHIL NFR BLD AUTO: 2 % (ref 0–6)
ERYTHROCYTE [DISTWIDTH] IN BLOOD BY AUTOMATED COUNT: 14.1 % (ref 11.6–15.1)
EST. AVERAGE GLUCOSE BLD GHB EST-MCNC: 108 MG/DL
FERRITIN SERPL-MCNC: 7 NG/ML (ref 11–307)
GFR SERPL CREATININE-BSD FRML MDRD: 48 ML/MIN/1.73SQ M
GLUCOSE P FAST SERPL-MCNC: 92 MG/DL (ref 65–99)
HBA1C MFR BLD: 5.4 %
HCT VFR BLD AUTO: 42.7 % (ref 34.8–46.1)
HDLC SERPL-MCNC: 132 MG/DL
HGB BLD-MCNC: 13.3 G/DL (ref 11.5–15.4)
IMM GRANULOCYTES # BLD AUTO: 0.02 THOUSAND/UL (ref 0–0.2)
IMM GRANULOCYTES NFR BLD AUTO: 0 % (ref 0–2)
IRON SERPL-MCNC: 83 UG/DL (ref 50–212)
LDLC SERPL CALC-MCNC: 26 MG/DL (ref 0–100)
LYMPHOCYTES # BLD AUTO: 1.72 THOUSANDS/ÂΜL (ref 0.6–4.47)
LYMPHOCYTES NFR BLD AUTO: 26 % (ref 14–44)
MCH RBC QN AUTO: 29.9 PG (ref 26.8–34.3)
MCHC RBC AUTO-ENTMCNC: 31.1 G/DL (ref 31.4–37.4)
MCV RBC AUTO: 96 FL (ref 82–98)
MONOCYTES # BLD AUTO: 0.61 THOUSAND/ÂΜL (ref 0.17–1.22)
MONOCYTES NFR BLD AUTO: 9 % (ref 4–12)
NEUTROPHILS # BLD AUTO: 4.11 THOUSANDS/ÂΜL (ref 1.85–7.62)
NEUTS SEG NFR BLD AUTO: 62 % (ref 43–75)
NRBC BLD AUTO-RTO: 0 /100 WBCS
PLATELET # BLD AUTO: 316 THOUSANDS/UL (ref 149–390)
PMV BLD AUTO: 9.7 FL (ref 8.9–12.7)
POTASSIUM SERPL-SCNC: 4.5 MMOL/L (ref 3.5–5.3)
PROT SERPL-MCNC: 6.8 G/DL (ref 6.4–8.4)
RBC # BLD AUTO: 4.45 MILLION/UL (ref 3.81–5.12)
SODIUM SERPL-SCNC: 138 MMOL/L (ref 135–147)
TRIGL SERPL-MCNC: 103 MG/DL
TSH SERPL DL<=0.05 MIU/L-ACNC: 0.8 UIU/ML (ref 0.45–4.5)
VIT B12 SERPL-MCNC: 574 PG/ML (ref 180–914)
WBC # BLD AUTO: 6.64 THOUSAND/UL (ref 4.31–10.16)

## 2024-04-25 PROCEDURE — 82728 ASSAY OF FERRITIN: CPT

## 2024-04-25 PROCEDURE — 83036 HEMOGLOBIN GLYCOSYLATED A1C: CPT

## 2024-04-25 PROCEDURE — 83540 ASSAY OF IRON: CPT

## 2024-04-25 PROCEDURE — 36415 COLL VENOUS BLD VENIPUNCTURE: CPT

## 2024-04-25 PROCEDURE — 80053 COMPREHEN METABOLIC PANEL: CPT

## 2024-04-25 PROCEDURE — 80061 LIPID PANEL: CPT

## 2024-04-25 PROCEDURE — 82607 VITAMIN B-12: CPT

## 2024-04-25 PROCEDURE — 84443 ASSAY THYROID STIM HORMONE: CPT

## 2024-04-25 PROCEDURE — 85025 COMPLETE CBC W/AUTO DIFF WBC: CPT

## 2024-04-26 ENCOUNTER — TELEPHONE (OUTPATIENT)
Dept: FAMILY MEDICINE CLINIC | Facility: CLINIC | Age: 44
End: 2024-04-26

## 2024-04-26 DIAGNOSIS — N17.9 ACUTE KIDNEY INJURY (HCC): Primary | ICD-10-CM

## 2024-04-26 NOTE — TELEPHONE ENCOUNTER
----- Message from Parris Crum MD sent at 4/26/2024 11:48 AM EDT -----  I discussed blood work results with the patient indicated acute kidney injury decrease in the GFR increase in the creatinine recommend well hydration plan to repeat a BMP in 1 week and UA

## 2024-04-26 NOTE — RESULT ENCOUNTER NOTE
I discussed blood work results with the patient indicated acute kidney injury decrease in the GFR increase in the creatinine recommend well hydration plan to repeat a BMP in 1 week and UA

## 2024-05-06 ENCOUNTER — APPOINTMENT (OUTPATIENT)
Dept: LAB | Facility: CLINIC | Age: 44
End: 2024-05-06
Payer: COMMERCIAL

## 2024-05-06 LAB
ANION GAP SERPL CALCULATED.3IONS-SCNC: 11 MMOL/L (ref 4–13)
BILIRUB UR QL STRIP: NEGATIVE
BUN SERPL-MCNC: 14 MG/DL (ref 5–25)
CALCIUM SERPL-MCNC: 8.6 MG/DL (ref 8.4–10.2)
CHLORIDE SERPL-SCNC: 101 MMOL/L (ref 96–108)
CLARITY UR: CLEAR
CO2 SERPL-SCNC: 27 MMOL/L (ref 21–32)
COLOR UR: COLORLESS
CREAT SERPL-MCNC: 0.7 MG/DL (ref 0.6–1.3)
GFR SERPL CREATININE-BSD FRML MDRD: 107 ML/MIN/1.73SQ M
GLUCOSE P FAST SERPL-MCNC: 71 MG/DL (ref 65–99)
GLUCOSE UR STRIP-MCNC: NEGATIVE MG/DL
HGB UR QL STRIP.AUTO: NEGATIVE
KETONES UR STRIP-MCNC: NEGATIVE MG/DL
LEUKOCYTE ESTERASE UR QL STRIP: NEGATIVE
NITRITE UR QL STRIP: NEGATIVE
PH UR STRIP.AUTO: 6.5 [PH]
POTASSIUM SERPL-SCNC: 3.7 MMOL/L (ref 3.5–5.3)
PROT UR STRIP-MCNC: NEGATIVE MG/DL
SODIUM SERPL-SCNC: 139 MMOL/L (ref 135–147)
SP GR UR STRIP.AUTO: 1 (ref 1–1.03)
UROBILINOGEN UR STRIP-ACNC: <2 MG/DL

## 2024-05-06 PROCEDURE — 81003 URINALYSIS AUTO W/O SCOPE: CPT

## 2024-05-06 PROCEDURE — 36415 COLL VENOUS BLD VENIPUNCTURE: CPT

## 2024-05-06 PROCEDURE — 80048 BASIC METABOLIC PNL TOTAL CA: CPT

## 2024-07-03 DIAGNOSIS — Z01.84 IMMUNITY STATUS TESTING: Primary | ICD-10-CM

## 2024-07-05 ENCOUNTER — APPOINTMENT (OUTPATIENT)
Dept: LAB | Facility: MEDICAL CENTER | Age: 44
End: 2024-07-05

## 2024-07-05 ENCOUNTER — LAB (OUTPATIENT)
Dept: LAB | Facility: MEDICAL CENTER | Age: 44
End: 2024-07-05
Payer: SELF-PAY

## 2024-07-05 ENCOUNTER — APPOINTMENT (OUTPATIENT)
Dept: URGENT CARE | Facility: MEDICAL CENTER | Age: 44
End: 2024-07-05

## 2024-07-05 DIAGNOSIS — Z02.0 ENCOUNTER FOR SCHOOL HEALTH EXAMINATION: ICD-10-CM

## 2024-07-05 DIAGNOSIS — N64.82 CONGENITAL HYPOPLASIA OF BREAST: ICD-10-CM

## 2024-07-05 DIAGNOSIS — Z01.818 OTHER SPECIFIED PRE-OPERATIVE EXAMINATION: ICD-10-CM

## 2024-07-05 DIAGNOSIS — Z01.84 IMMUNITY STATUS TESTING: ICD-10-CM

## 2024-07-05 DIAGNOSIS — Z01.812 PRE-OPERATIVE LABORATORY EXAMINATION: ICD-10-CM

## 2024-07-05 DIAGNOSIS — Z00.129 ENCOUNTER FOR PRE-SCHOOL HEALTH EXAMINATION: Primary | ICD-10-CM

## 2024-07-05 LAB
ANION GAP SERPL CALCULATED.3IONS-SCNC: 12 MMOL/L (ref 4–13)
BASOPHILS # BLD AUTO: 0.08 THOUSANDS/ÂΜL (ref 0–0.1)
BASOPHILS NFR BLD AUTO: 1 % (ref 0–1)
BUN SERPL-MCNC: 13 MG/DL (ref 5–25)
CALCIUM SERPL-MCNC: 8.6 MG/DL (ref 8.4–10.2)
CHLORIDE SERPL-SCNC: 100 MMOL/L (ref 96–108)
CO2 SERPL-SCNC: 24 MMOL/L (ref 21–32)
CREAT SERPL-MCNC: 0.64 MG/DL (ref 0.6–1.3)
EOSINOPHIL # BLD AUTO: 0.09 THOUSAND/ÂΜL (ref 0–0.61)
EOSINOPHIL NFR BLD AUTO: 2 % (ref 0–6)
ERYTHROCYTE [DISTWIDTH] IN BLOOD BY AUTOMATED COUNT: 15.1 % (ref 11.6–15.1)
GFR SERPL CREATININE-BSD FRML MDRD: 111 ML/MIN/1.73SQ M
GLUCOSE P FAST SERPL-MCNC: 81 MG/DL (ref 65–99)
HBV CORE AB SER QL: NORMAL
HBV CORE IGM SER QL: NORMAL
HBV SURFACE AG SER QL: NORMAL
HCT VFR BLD AUTO: 42.8 % (ref 34.8–46.1)
HGB BLD-MCNC: 13.7 G/DL (ref 11.5–15.4)
IMM GRANULOCYTES # BLD AUTO: 0.02 THOUSAND/UL (ref 0–0.2)
IMM GRANULOCYTES NFR BLD AUTO: 0 % (ref 0–2)
LYMPHOCYTES # BLD AUTO: 2.02 THOUSANDS/ÂΜL (ref 0.6–4.47)
LYMPHOCYTES NFR BLD AUTO: 34 % (ref 14–44)
MCH RBC QN AUTO: 30 PG (ref 26.8–34.3)
MCHC RBC AUTO-ENTMCNC: 32 G/DL (ref 31.4–37.4)
MCV RBC AUTO: 94 FL (ref 82–98)
MONOCYTES # BLD AUTO: 0.77 THOUSAND/ÂΜL (ref 0.17–1.22)
MONOCYTES NFR BLD AUTO: 13 % (ref 4–12)
NEUTROPHILS # BLD AUTO: 2.99 THOUSANDS/ÂΜL (ref 1.85–7.62)
NEUTS SEG NFR BLD AUTO: 50 % (ref 43–75)
NRBC BLD AUTO-RTO: 0 /100 WBCS
PLATELET # BLD AUTO: 337 THOUSANDS/UL (ref 149–390)
PMV BLD AUTO: 9.8 FL (ref 8.9–12.7)
POTASSIUM SERPL-SCNC: 4 MMOL/L (ref 3.5–5.3)
RBC # BLD AUTO: 4.56 MILLION/UL (ref 3.81–5.12)
SODIUM SERPL-SCNC: 136 MMOL/L (ref 135–147)
VZV IGG SER QL IA: NORMAL
WBC # BLD AUTO: 5.97 THOUSAND/UL (ref 4.31–10.16)

## 2024-07-05 PROCEDURE — 85025 COMPLETE CBC W/AUTO DIFF WBC: CPT

## 2024-07-05 PROCEDURE — 86787 VARICELLA-ZOSTER ANTIBODY: CPT

## 2024-07-05 PROCEDURE — 80048 BASIC METABOLIC PNL TOTAL CA: CPT

## 2024-07-05 PROCEDURE — 86480 TB TEST CELL IMMUN MEASURE: CPT

## 2024-07-05 PROCEDURE — 86765 RUBEOLA ANTIBODY: CPT

## 2024-07-05 PROCEDURE — 86705 HEP B CORE ANTIBODY IGM: CPT

## 2024-07-05 PROCEDURE — 86762 RUBELLA ANTIBODY: CPT

## 2024-07-05 PROCEDURE — 86735 MUMPS ANTIBODY: CPT

## 2024-07-05 PROCEDURE — 36415 COLL VENOUS BLD VENIPUNCTURE: CPT

## 2024-07-05 PROCEDURE — 86704 HEP B CORE ANTIBODY TOTAL: CPT

## 2024-07-05 PROCEDURE — 87340 HEPATITIS B SURFACE AG IA: CPT

## 2024-07-06 LAB
MEV IGG SER IA-ACNC: 85.1 AU/ML
MUV IGG SER IA-ACNC: <9 AU/ML
RUBV IGG SERPL IA-ACNC: 1.19 INDEX
VZV IGM SER IA-ACNC: <0.91 INDEX (ref 0–0.9)

## 2024-07-08 ENCOUNTER — TELEPHONE (OUTPATIENT)
Dept: FAMILY MEDICINE CLINIC | Facility: CLINIC | Age: 44
End: 2024-07-08

## 2024-07-08 DIAGNOSIS — Z01.84 IMMUNITY STATUS TESTING: Primary | ICD-10-CM

## 2024-07-08 LAB
GAMMA INTERFERON BACKGROUND BLD IA-ACNC: 0.24 IU/ML
M TB IFN-G BLD-IMP: NEGATIVE
M TB IFN-G CD4+ BCKGRND COR BLD-ACNC: -0.05 IU/ML
M TB IFN-G CD4+ BCKGRND COR BLD-ACNC: 0 IU/ML
MITOGEN IGNF BCKGRD COR BLD-ACNC: 9.76 IU/ML

## 2024-07-08 NOTE — TELEPHONE ENCOUNTER
----- Message from Parris Crum MD sent at 7/8/2024  3:06 PM EDT -----  To do Hepatitis B surface AB titer  Not immune for mumps to have MMR vaccine

## 2024-07-09 DIAGNOSIS — Z01.84 IMMUNITY STATUS TESTING: Primary | ICD-10-CM

## 2024-07-11 ENCOUNTER — APPOINTMENT (OUTPATIENT)
Dept: LAB | Facility: CLINIC | Age: 44
End: 2024-07-11
Payer: COMMERCIAL

## 2024-07-11 DIAGNOSIS — Z01.84 IMMUNITY STATUS TESTING: ICD-10-CM

## 2024-07-11 PROCEDURE — 86706 HEP B SURFACE ANTIBODY: CPT

## 2024-07-11 PROCEDURE — 36415 COLL VENOUS BLD VENIPUNCTURE: CPT

## 2024-07-12 ENCOUNTER — CLINICAL SUPPORT (OUTPATIENT)
Dept: FAMILY MEDICINE CLINIC | Facility: CLINIC | Age: 44
End: 2024-07-12
Payer: COMMERCIAL

## 2024-07-12 DIAGNOSIS — Z23 ENCOUNTER FOR IMMUNIZATION: Primary | ICD-10-CM

## 2024-07-12 DIAGNOSIS — Z23 NEED FOR MMR VACCINE: ICD-10-CM

## 2024-07-12 LAB — HBV SURFACE AB SER-ACNC: 211 MIU/ML

## 2024-07-12 PROCEDURE — 90707 MMR VACCINE SC: CPT

## 2024-07-12 PROCEDURE — 90471 IMMUNIZATION ADMIN: CPT

## 2024-07-15 ENCOUNTER — OFFICE VISIT (OUTPATIENT)
Dept: FAMILY MEDICINE CLINIC | Facility: CLINIC | Age: 44
End: 2024-07-15
Payer: COMMERCIAL

## 2024-07-15 VITALS
WEIGHT: 142 LBS | BODY MASS INDEX: 25.16 KG/M2 | HEART RATE: 69 BPM | SYSTOLIC BLOOD PRESSURE: 110 MMHG | DIASTOLIC BLOOD PRESSURE: 70 MMHG | TEMPERATURE: 98.7 F | HEIGHT: 63 IN | OXYGEN SATURATION: 98 %

## 2024-07-15 DIAGNOSIS — Z00.01 ENCOUNTER FOR PREVENTATIVE ADULT HEALTH CARE EXAM WITH ABNORMAL FINDINGS: Primary | ICD-10-CM

## 2024-07-15 DIAGNOSIS — Z12.4 CERVICAL CANCER SCREENING: ICD-10-CM

## 2024-07-15 DIAGNOSIS — E66.3 OVERWEIGHT (BMI 25.0-29.9): ICD-10-CM

## 2024-07-15 PROCEDURE — 99396 PREV VISIT EST AGE 40-64: CPT | Performed by: FAMILY MEDICINE

## 2024-07-15 NOTE — PROGRESS NOTES
Adult Annual Physical  Name: Yahaira Cerrato      : 1980      MRN: [id number]  Encounter Provider: Parris Crum MD  Encounter Date: 7/15/2024   Encounter department: [facility]    Assessment & Plan   1. Encounter for preventative adult health care exam with abnormal findings  Assessment & Plan:  Advice and education were given regarding nutrition, aerobic exercises, weight-bearing exercises, cardiovascular risk reduction, fall risk reduction, and age-appropriate supplements.     The patient was counseled regarding instructions for management, risk factor reductions, prognosis, risks and benefits of treatment options, patient and family education, and importance of compliance with treatment.  Discussed with the patient she is due for mammogram screening for breast cancer  2. Overweight (BMI 25.0-29.9)  Assessment & Plan:  BMI today 25.5 patient has history of bariatric surgery discussed portion control low-carb low-fat diet increase physical activity  3. Cervical cancer screening  Assessment & Plan:  Patient is due for cervical cancer screening she will call her GYN for appointment    Immunizations and preventive care screenings were discussed with patient today. Appropriate education was printed on patient's after visit summary.    Counseling:  Alcohol/drug use: discussed moderation in alcohol intake, the recommendations for healthy alcohol use, and avoidance of illicit drug use.  Dental Health: discussed importance of regular tooth brushing, flossing, and dental visits.  Injury prevention: discussed safety/seat belts, safety helmets, smoke detectors, carbon dioxide detectors, and smoking near bedding or upholstery.  Sexual health: discussed sexually transmitted diseases, partner selection, use of condoms, avoidance of unintended pregnancy, and contraceptive alternatives.  Exercise: the importance of regular exercise/physical activity was discussed. Recommend exercise  "3-5 times per week for at least 30 minutes.          History of Present Illness   Patient here for well exam and no new concerns past medical surgical family and social history reviewed recent blood work reviewed    Adult Annual Physical:  Patient presents for annual physical.     Diet and Physical Activity:  - Diet/Nutrition:. no special diet  - Exercise: walking.    Depression Screening:  - PHQ-2 Score: 0    General Health:  - Sleep: 4-6 hours of sleep on average.  - Hearing: normal hearing bilateral ears.  - Vision: wears glasses.  - Dental: regular dental visits.    /GYN Health:  - Follows with GYN: yes.   - Menopause: premenopausal.   - History of STDs: no  - Contraception:. tubal ligation      Advanced Care Planning:  - Has an advanced directive?: no    - Has a durable medical POA?: no      Review of Systems   Constitutional:  Negative for chills and fever.   HENT:  Negative for ear pain and sore throat.    Eyes:  Negative for pain and visual disturbance.   Respiratory:  Negative for cough and shortness of breath.    Cardiovascular:  Negative for chest pain and palpitations.   Gastrointestinal:  Negative for abdominal pain, constipation and vomiting.   Genitourinary:  Negative for dysuria and hematuria.   Musculoskeletal:  Negative for gait problem.   Skin:  Negative for color change and rash.   Neurological:  Negative for seizures and syncope.   Psychiatric/Behavioral:  Negative for behavioral problems.    All other systems reviewed and are negative.        Objective     /70 (BP Location: Left arm, Patient Position: Sitting)   Pulse 69   Temp 98.7 °F (37.1 °C) (Tympanic)   Ht 5' 2.5\" (1.588 m)   Wt 64.4 kg (142 lb)   LMP 06/25/2024 (Exact Date)   SpO2 98%   Breastfeeding No   BMI 25.56 kg/m²     Physical Exam  Vitals and nursing note reviewed.   Constitutional:       General: She is not in acute distress.     Appearance: She is well-developed. She is not diaphoretic.   HENT:      Head: " Normocephalic.      Right Ear: Tympanic membrane and external ear normal.      Left Ear: Tympanic membrane and external ear normal.      Nose: No rhinorrhea.      Mouth/Throat:      Pharynx: No posterior oropharyngeal erythema.   Eyes:      General:         Right eye: No discharge.         Left eye: No discharge.      Conjunctiva/sclera: Conjunctivae normal.   Neck:      Vascular: No JVD.   Cardiovascular:      Rate and Rhythm: Normal rate and regular rhythm.      Heart sounds: Normal heart sounds. No murmur heard.     No gallop.   Pulmonary:      Effort: Pulmonary effort is normal. No respiratory distress.      Breath sounds: Normal breath sounds. No stridor. No wheezing or rales.   Chest:      Chest wall: No tenderness.   Abdominal:      General: There is no distension.      Palpations: Abdomen is soft. There is no mass.      Tenderness: There is no abdominal tenderness. There is no rebound.   Musculoskeletal:         General: No tenderness.      Cervical back: Normal range of motion and neck supple.   Lymphadenopathy:      Cervical: No cervical adenopathy.   Skin:     General: Skin is warm.      Findings: No erythema or rash.   Neurological:      Mental Status: She is alert and oriented to person, place, and time.

## 2024-07-15 NOTE — ASSESSMENT & PLAN NOTE
Advice and education were given regarding nutrition, aerobic exercises, weight-bearing exercises, cardiovascular risk reduction, fall risk reduction, and age-appropriate supplements.     The patient was counseled regarding instructions for management, risk factor reductions, prognosis, risks and benefits of treatment options, patient and family education, and importance of compliance with treatment.  Discussed with the patient she is due for mammogram screening for breast cancer

## 2024-07-15 NOTE — ASSESSMENT & PLAN NOTE
BMI today 25.5 patient has history of bariatric surgery discussed portion control low-carb low-fat diet increase physical activity

## 2024-07-16 ENCOUNTER — ANESTHESIA EVENT (OUTPATIENT)
Dept: PERIOP | Facility: HOSPITAL | Age: 44
End: 2024-07-16
Payer: SELF-PAY

## 2024-07-16 NOTE — PRE-PROCEDURE INSTRUCTIONS
Pre-Surgery Instructions:   Medication Instructions    cyanocobalamin 1,000 mcg/mL Next injection on 7/17   Medication instructions for day surgery reviewed. Please use only a sip of water to take your instructed medications. Avoid all over the counter vitamins, supplements and NSAIDS for one week prior to surgery per anesthesia guidelines. Tylenol is ok to take as needed.     You will receive a call one business day prior to surgery with an arrival time and hospital directions. If your surgery is scheduled on a Monday, the hospital will be calling you on the Friday prior to your surgery. If you have not heard from anyone by 8pm, please call the hospital supervisor through the hospital  at [phone]. ([place] [phone] or [place] [phone]).    Do not eat or drink anything after midnight the night before your surgery, including candy, mints, lifesavers, or chewing gum. Do not drink alcohol 24hrs before your surgery. Try not to smoke at least 24hrs before your surgery.       Follow the pre surgery showering instructions as listed in the “My Surgical Experience Booklet” or otherwise provided by your surgeon's office. Do not use a blade to shave the surgical area 1 week before surgery. It is okay to use a clean electric clippers up to 24 hours before surgery. Do not apply any lotions, creams, including makeup, cologne, deodorant, or perfumes after showering on the day of your surgery. Do not use dry shampoo, hair spray, hair gel, or any type of hair products.     No contact lenses, eye make-up, or artificial eyelashes. Remove nail polish, including gel polish, and any artificial, gel, or acrylic nails if possible. Remove all jewelry including rings and body piercing jewelry.     Wear causal clothing that is easy to take on and off. Consider your type of surgery.    Keep any valuables, jewelry, piercings at home. Please bring any specially ordered equipment (sling, braces) if indicated.    Arrange for  a responsible person to drive you to and from the hospital on the day of your surgery. Please confirm the visitor policy for the day of your procedure when you receive your phone call with an arrival time.     Call the surgeon's office with any new illnesses, exposures, or additional questions prior to surgery.    Please reference your “My Surgical Experience Booklet” for additional information to prepare for your upcoming surgery.

## 2024-07-19 ENCOUNTER — ANESTHESIA (OUTPATIENT)
Dept: PERIOP | Facility: HOSPITAL | Age: 44
End: 2024-07-19
Payer: SELF-PAY

## 2024-07-19 ENCOUNTER — HOSPITAL ENCOUNTER (OUTPATIENT)
Facility: HOSPITAL | Age: 44
Setting detail: OUTPATIENT SURGERY
Discharge: HOME | End: 2024-07-19
Attending: SURGERY | Admitting: SURGERY
Payer: SELF-PAY

## 2024-07-19 VITALS
RESPIRATION RATE: 18 BRPM | BODY MASS INDEX: 28.62 KG/M2 | SYSTOLIC BLOOD PRESSURE: 118 MMHG | WEIGHT: 159 LBS | TEMPERATURE: 97.9 F | DIASTOLIC BLOOD PRESSURE: 76 MMHG | OXYGEN SATURATION: 100 % | HEART RATE: 69 BPM

## 2024-07-19 DIAGNOSIS — T85.44XA CAPSULAR CONTRACTURE OF LEFT BREAST IMPLANT: ICD-10-CM

## 2024-07-19 LAB
EXT PREGNANCY TEST URINE: NEGATIVE
EXT. CONTROL: NORMAL

## 2024-07-19 PROCEDURE — 88305 TISSUE EXAM BY PATHOLOGIST: CPT | Performed by: PATHOLOGY

## 2024-07-19 PROCEDURE — 81025 URINE PREGNANCY TEST: CPT | Performed by: SURGERY

## 2024-07-19 PROCEDURE — L8600 IMPLANT BREAST SILICONE/EQ: HCPCS | Performed by: SURGERY

## 2024-07-19 RX ORDER — KETOROLAC TROMETHAMINE 30 MG/ML
30 INJECTION, SOLUTION INTRAMUSCULAR; INTRAVENOUS ONCE
Status: DISCONTINUED | OUTPATIENT
Start: 2024-07-19 | End: 2024-07-19 | Stop reason: HOSPADM

## 2024-07-19 RX ORDER — PROPOFOL 10 MG/ML
INJECTION, EMULSION INTRAVENOUS AS NEEDED
Status: DISCONTINUED | OUTPATIENT
Start: 2024-07-19 | End: 2024-07-19

## 2024-07-19 RX ORDER — KETOROLAC TROMETHAMINE 30 MG/ML
INJECTION, SOLUTION INTRAMUSCULAR; INTRAVENOUS AS NEEDED
Status: DISCONTINUED | OUTPATIENT
Start: 2024-07-19 | End: 2024-07-19

## 2024-07-19 RX ORDER — MIDAZOLAM HYDROCHLORIDE 2 MG/2ML
INJECTION, SOLUTION INTRAMUSCULAR; INTRAVENOUS AS NEEDED
Status: DISCONTINUED | OUTPATIENT
Start: 2024-07-19 | End: 2024-07-19

## 2024-07-19 RX ORDER — HYDROCODONE BITARTRATE AND ACETAMINOPHEN 5; 325 MG/1; MG/1
1 TABLET ORAL EVERY 4 HOURS PRN
Status: DISCONTINUED | OUTPATIENT
Start: 2024-07-19 | End: 2024-07-19 | Stop reason: HOSPADM

## 2024-07-19 RX ORDER — ONDANSETRON 2 MG/ML
4 INJECTION INTRAMUSCULAR; INTRAVENOUS ONCE AS NEEDED
Status: DISCONTINUED | OUTPATIENT
Start: 2024-07-19 | End: 2024-07-19 | Stop reason: HOSPADM

## 2024-07-19 RX ORDER — ACETAMINOPHEN 10 MG/ML
INJECTION, SOLUTION INTRAVENOUS AS NEEDED
Status: DISCONTINUED | OUTPATIENT
Start: 2024-07-19 | End: 2024-07-19

## 2024-07-19 RX ORDER — FENTANYL CITRATE 50 UG/ML
INJECTION, SOLUTION INTRAMUSCULAR; INTRAVENOUS AS NEEDED
Status: DISCONTINUED | OUTPATIENT
Start: 2024-07-19 | End: 2024-07-19

## 2024-07-19 RX ORDER — DEXAMETHASONE SODIUM PHOSPHATE 10 MG/ML
INJECTION, SOLUTION INTRAMUSCULAR; INTRAVENOUS AS NEEDED
Status: DISCONTINUED | OUTPATIENT
Start: 2024-07-19 | End: 2024-07-19

## 2024-07-19 RX ORDER — CEFAZOLIN SODIUM 2 G/50ML
2000 SOLUTION INTRAVENOUS ONCE
Status: COMPLETED | OUTPATIENT
Start: 2024-07-19 | End: 2024-07-19

## 2024-07-19 RX ORDER — ALBUTEROL SULFATE 2.5 MG/3ML
2.5 SOLUTION RESPIRATORY (INHALATION) ONCE AS NEEDED
Status: DISCONTINUED | OUTPATIENT
Start: 2024-07-19 | End: 2024-07-19 | Stop reason: HOSPADM

## 2024-07-19 RX ORDER — BUPIVACAINE HYDROCHLORIDE AND EPINEPHRINE 2.5; 5 MG/ML; UG/ML
INJECTION, SOLUTION EPIDURAL; INFILTRATION; INTRACAUDAL; PERINEURAL AS NEEDED
Status: DISCONTINUED | OUTPATIENT
Start: 2024-07-19 | End: 2024-07-19 | Stop reason: HOSPADM

## 2024-07-19 RX ORDER — FENTANYL CITRATE/PF 50 MCG/ML
25 SYRINGE (ML) INJECTION
Status: DISCONTINUED | OUTPATIENT
Start: 2024-07-19 | End: 2024-07-19 | Stop reason: HOSPADM

## 2024-07-19 RX ORDER — HYDROCODONE BITARTRATE AND ACETAMINOPHEN 5; 325 MG/1; MG/1
2 TABLET ORAL EVERY 4 HOURS PRN
Status: DISCONTINUED | OUTPATIENT
Start: 2024-07-19 | End: 2024-07-19 | Stop reason: HOSPADM

## 2024-07-19 RX ORDER — MAGNESIUM HYDROXIDE 1200 MG/15ML
LIQUID ORAL AS NEEDED
Status: DISCONTINUED | OUTPATIENT
Start: 2024-07-19 | End: 2024-07-19 | Stop reason: HOSPADM

## 2024-07-19 RX ORDER — HYDROMORPHONE HCL IN WATER/PF 6 MG/30 ML
0.2 PATIENT CONTROLLED ANALGESIA SYRINGE INTRAVENOUS
Status: DISCONTINUED | OUTPATIENT
Start: 2024-07-19 | End: 2024-07-19 | Stop reason: HOSPADM

## 2024-07-19 RX ORDER — SODIUM CHLORIDE, SODIUM LACTATE, POTASSIUM CHLORIDE, CALCIUM CHLORIDE 600; 310; 30; 20 MG/100ML; MG/100ML; MG/100ML; MG/100ML
50 INJECTION, SOLUTION INTRAVENOUS CONTINUOUS
Status: DISCONTINUED | OUTPATIENT
Start: 2024-07-19 | End: 2024-07-19 | Stop reason: HOSPADM

## 2024-07-19 RX ORDER — LIDOCAINE HYDROCHLORIDE 10 MG/ML
INJECTION, SOLUTION EPIDURAL; INFILTRATION; INTRACAUDAL; PERINEURAL AS NEEDED
Status: DISCONTINUED | OUTPATIENT
Start: 2024-07-19 | End: 2024-07-19

## 2024-07-19 RX ORDER — ROCURONIUM BROMIDE 10 MG/ML
INJECTION, SOLUTION INTRAVENOUS AS NEEDED
Status: DISCONTINUED | OUTPATIENT
Start: 2024-07-19 | End: 2024-07-19

## 2024-07-19 RX ORDER — ONDANSETRON 2 MG/ML
INJECTION INTRAMUSCULAR; INTRAVENOUS AS NEEDED
Status: DISCONTINUED | OUTPATIENT
Start: 2024-07-19 | End: 2024-07-19

## 2024-07-19 RX ADMIN — ONDANSETRON 4 MG: 2 INJECTION INTRAMUSCULAR; INTRAVENOUS at 07:33

## 2024-07-19 RX ADMIN — SODIUM CHLORIDE 250 ML: 0.9 INJECTION, SOLUTION INTRAVENOUS at 06:14

## 2024-07-19 RX ADMIN — FENTANYL CITRATE 50 MCG: 50 INJECTION, SOLUTION INTRAMUSCULAR; INTRAVENOUS at 07:44

## 2024-07-19 RX ADMIN — MIDAZOLAM 2 MG: 1 INJECTION INTRAMUSCULAR; INTRAVENOUS at 07:10

## 2024-07-19 RX ADMIN — PROPOFOL 200 MG: 10 INJECTION, EMULSION INTRAVENOUS at 07:28

## 2024-07-19 RX ADMIN — FENTANYL CITRATE 25 MCG: 50 INJECTION, SOLUTION INTRAMUSCULAR; INTRAVENOUS at 09:44

## 2024-07-19 RX ADMIN — FENTANYL CITRATE 50 MCG: 50 INJECTION, SOLUTION INTRAMUSCULAR; INTRAVENOUS at 08:23

## 2024-07-19 RX ADMIN — SODIUM CHLORIDE, SODIUM LACTATE, POTASSIUM CHLORIDE, AND CALCIUM CHLORIDE 50 ML/HR: .6; .31; .03; .02 INJECTION, SOLUTION INTRAVENOUS at 06:05

## 2024-07-19 RX ADMIN — FENTANYL CITRATE 25 MCG: 50 INJECTION, SOLUTION INTRAMUSCULAR; INTRAVENOUS at 09:51

## 2024-07-19 RX ADMIN — FENTANYL CITRATE 50 MCG: 50 INJECTION, SOLUTION INTRAMUSCULAR; INTRAVENOUS at 08:28

## 2024-07-19 RX ADMIN — SODIUM CHLORIDE, SODIUM LACTATE, POTASSIUM CHLORIDE, AND CALCIUM CHLORIDE: .6; .31; .03; .02 INJECTION, SOLUTION INTRAVENOUS at 08:36

## 2024-07-19 RX ADMIN — ROCURONIUM BROMIDE 40 MG: 10 INJECTION INTRAVENOUS at 07:28

## 2024-07-19 RX ADMIN — FENTANYL CITRATE 50 MCG: 50 INJECTION, SOLUTION INTRAMUSCULAR; INTRAVENOUS at 07:28

## 2024-07-19 RX ADMIN — HYDROCODONE BITARTRATE AND ACETAMINOPHEN 1 TABLET: 5; 325 TABLET ORAL at 10:42

## 2024-07-19 RX ADMIN — ACETAMINOPHEN 1000 MG: 10 INJECTION INTRAVENOUS at 08:30

## 2024-07-19 RX ADMIN — CEFAZOLIN SODIUM 2000 MG: 2 SOLUTION INTRAVENOUS at 07:33

## 2024-07-19 RX ADMIN — LIDOCAINE HYDROCHLORIDE 50 MG: 10 INJECTION, SOLUTION EPIDURAL; INFILTRATION; INTRACAUDAL; PERINEURAL at 07:28

## 2024-07-19 RX ADMIN — SUGAMMADEX 200 MG: 100 INJECTION, SOLUTION INTRAVENOUS at 09:00

## 2024-07-19 RX ADMIN — KETOROLAC TROMETHAMINE 30 MG: 30 INJECTION, SOLUTION INTRAMUSCULAR; INTRAVENOUS at 08:59

## 2024-07-19 RX ADMIN — SODIUM CHLORIDE, SODIUM LACTATE, POTASSIUM CHLORIDE, AND CALCIUM CHLORIDE 50 ML/HR: .6; .31; .03; .02 INJECTION, SOLUTION INTRAVENOUS at 09:50

## 2024-07-19 RX ADMIN — DEXAMETHASONE SODIUM PHOSPHATE 10 MG: 10 INJECTION, SOLUTION INTRAMUSCULAR; INTRAVENOUS at 07:33

## 2024-07-19 NOTE — ANESTHESIA PREPROCEDURE EVALUATION
Procedure:  LEFT BREAST IMPLANT EXCHANGE (Left: Breast)    Relevant Problems   HEMATOLOGY   (+) Iron deficiency anemia   (+) Other dietary vitamin B12 deficiency anemia      Surgery/Wound/Pain   (+) Capsular contracture of breast implant   (+) Status post bariatric surgery      Other   (+) Vitamin D deficiency        Physical Exam    Airway    Mallampati score: II  TM Distance: >3 FB  Neck ROM: full     Dental       Cardiovascular  Cardiovascular exam normal    Pulmonary  Pulmonary exam normal     Other Findings  post-pubertal.      Anesthesia Plan  ASA Score- 2     Anesthesia Type- general with ASA Monitors.         Additional Monitors:     Airway Plan: ETT.           Plan Factors-Exercise tolerance (METS): >4 METS.    Chart reviewed.   Existing labs reviewed. Patient summary reviewed.    Patient is not a current smoker. Patient not instructed to abstain from smoking on day of procedure. Patient did not smoke on day of surgery.            Induction- intravenous.    Postoperative Plan- Plan for postoperative opioid use.     Perioperative Resuscitation Plan - Level 1 - Full Code.       Informed Consent- Anesthetic plan and risks discussed with patient.  I personally reviewed this patient with the CRNA. Discussed and agreed on the Anesthesia Plan with the CRNA..        Lab Results   Component Value Date    HGBA1C 5.4 04/25/2024       Lab Results   Component Value Date    K 4.0 07/05/2024     07/05/2024    CO2 24 07/05/2024    BUN 13 07/05/2024    CREATININE 0.64 07/05/2024    GLUF 81 07/05/2024    CALCIUM 8.6 07/05/2024    AST 16 04/25/2024    ALT 14 04/25/2024    ALKPHOS 59 04/25/2024    EGFR 111 07/05/2024       Lab Results   Component Value Date    WBC 5.97 07/05/2024    HGB 13.7 07/05/2024    HCT 42.8 07/05/2024    MCV 94 07/05/2024     07/05/2024

## 2024-07-19 NOTE — ANESTHESIA POSTPROCEDURE EVALUATION
Post-Op Assessment Note    CV Status:  Stable    Pain management: adequate       Mental Status:  Awake   Hydration Status:  Stable   PONV Controlled:  Controlled   Airway Patency:  Patent     Post Op Vitals Reviewed: Yes    No anethesia notable event occurred.    Staff: Anesthesiologist, CRNA               BP   137/72   Temp   97.1   Pulse  72   Resp   18   SpO2   100

## 2024-07-19 NOTE — INTERVAL H&P NOTE
H&P reviewed. After examining the patient I find no changes in the patients condition since the H&P had been written.    Vitals:    07/19/24 0542   BP: 154/91   Pulse: 71   Resp: 18   Temp: 97.6 °F (36.4 °C)   SpO2: 100%

## 2024-07-19 NOTE — DISCHARGE SUMMARY
SUBJECTIVE:   Angeli Jacobson is a 70 year old female who presents to clinic today for the following health issues:      Concern - Left leg problems  Onset: X 6-7 weeks    Description:   Fell on her knee's 6-7 weeks ago, denies any pain now, swelling has decreased, but after walking 2-3 blocks her leg becomes very fatigued in the calf and thigh. No back pain.   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 Patient has chronic left leg lymphedema due to this. Wears stocking.       Intensity: moderate    Progression of Symptoms:  same    Accompanying Signs & Symptoms:  none    Previous history of similar problem:   None     Precipitating factors:   Worsened by: walking     Alleviating factors:  Improved by:  Rest    Therapies Tried and outcome: none         Problem list and histories reviewed & adjusted, as indicated.  Additional history: as documented    Patient Active Problem List   Diagnosis     Pain in joint, multiple sites     MULTINODUL GOITER     Hearing loss     Hyperlipidemia LDL goal <160     Osteoporosis     Cervicalgia     Major depressive disorder, recurrent episode, moderate (H)     Impaired fasting glucose     Advanced directives, counseling/discussion     Lymphedema of left leg     Tubular adenoma     Other constipation     Vertigo     Myxoid liposarcoma (HCC)     Past Surgical History:   Procedure Laterality Date     C APPENDECTOMY      Appendectomy     C NONSPECIFIC PROCEDURE  04/2000    Open Biopsy Left Thigh Liposarcoma     COLONOSCOPY N/A 2/5/2016    Procedure: COMBINED COLONOSCOPY, SINGLE OR MULTIPLE BIOPSY/POLYPECTOMY BY BIOPSY;  Surgeon: Varun Stanley MD, MD;  Location: [facility]     EXCISION MALIG LESION>1.25CM  5/2000    Myxoid Liposarcoma       HC COLONOSCOPY THRU STOMA, DIAGNOSTIC  2006    due 2010     HC COLP CERVIX/UPPER VAGINA  07/1997    Negative     HC DILATION/CURETTAGE DIAG/THER NON OB  02/1997    Post menopausal bleeding on HRT, negative      PLASTIC, RECONSTRUCTIVE, & HAND SURGERY   Discharge Summary  Date of Admission:   7/19/2024  Date of Discharge:   07/19/24  Attending:  Daryl Nuñez MD  Principal/Final Diagnosis:   Capsular contracture of left breast implant [T85.44XA]  Principal Procedure:   LEFT BREAST IMPLANT EXCHANGE (Left: Breast)  Discharge Medications:  See after visit summary for reconciled discharge medications provided to patient and family.  Reason for Admission:  Yahaira Cerrato was electively admitted to undergo the above named procedure on 7/19/2024 as an outpatient.  Hospital Course:  Patient underwent the above named procedure on the day of admission without complications. They were discharged home the same day.  Disposition:  To home in care of self and family.  Condition:  Good  Follow up:  Patient with follow up in the office with Dr. Daryl Nuñez MD in 1 week(s) or as scheduled per his office  Daryl Nuñez MD  7/19/2024 7:02 AM       Social History   Substance Use Topics     Smoking status: Never Smoker     Smokeless tobacco: Never Used     Alcohol use No     Family History   Problem Relation Age of Onset     C.A.D. Father      MI 57     Alcohol/Drug Father      etoh     Obesity Mother      OSTEOPOROSIS Mother      Lipids Brother      C.A.D. Paternal Grandmother      ascvd     DIABETES Maternal Grandmother      CANCER Maternal Grandmother      C.A.D. Paternal Uncle      Mi  age 48     CANCER Maternal Aunt      pancreatic CA           Reviewed and updated as needed this visit by clinical staff  Tobacco  Allergies  Meds  Med Hx  Surg Hx  Fam Hx  Soc Hx      Reviewed and updated as needed this visit by Provider         ROS:  Constitutional, HEENT, cardiovascular, pulmonary, gi and gu systems are negative, except as otherwise noted.    OBJECTIVE:     /79 (BP Location: Right arm, Patient Position: Chair, Cuff Size: Adult Large)  Pulse 96  Temp 98.1  F (36.7  C) (Oral)  Resp 14  Wt 152 lb (68.9 kg)  LMP 2005  BMI 24.53 kg/m2  Body mass index is 24.53 kg/(m^2).  GENERAL APPEARANCE: healthy, alert and no distress  RESP: lungs clear to auscultation - no rales, rhonchi or wheezes  CV: regular rates and rhythm, normal S1 S2, no S3 or S4 and no murmur, click or rub  MS: FROM left leg. Mild edema in thigh and leg. Slight decreased intensity of tibialis pulse.  No TTP over knee, mild medial swelling.         ASSESSMENT/PLAN:             1. Fatigue of lower extremity  Given fatigue of left leg with exercise, improves with rest. Will await study.  If symptoms suddenly worse, ER. The patient indicates understanding of these issues and agrees with the plan.    - US ADRIAN Doppler with Exercise Left; Future    2. Lymphedema of left leg  Use limited time to reduce swelling.   Chronic, but will use for immediate slight relief  - furosemide (LASIX) 20 MG tablet; Take 1 tablet (20 mg) by mouth daily for 7 days  Dispense: 7 tablet;  Refill: 0        Sepideh Burris PA-C  [facility]

## 2024-07-19 NOTE — DISCHARGE INSTR - AVS FIRST PAGE
[facility]     [facility], [address] [place] [address], [address], [place] [address]   P [phone] / F [phone] / [email or web address]     Home Instructions for Augmentation Mammoplasty Patient     Please call the office today to schedule a post operative appointment, and tell the office staff  that you doctor needs see you in our office in 7-10 days.    If there are drains they will be removed in 1-3 days.    Your implants will sit high on your chest. It will take 3-6 months for the breast tissue to stretch and accommodate the implant.    Someone should care for you the first 48 hours. You should relax at home; avoid bending, lifting, or other strenuous activities. Care should be taken with normal daily activities. If you have small children arrangements should be made for the .    The breasts will may be black and blue and swollen to a varying degree for up to 3 weeks.    You may shower the day after surgery unless instructed otherwise.    You should make arrangements to be off from work at least one week following surgery. It is best to limit strenuous exercise, sports, and arm lifting for 1 month.    Do not drive a car while you are on pain medications.    You will be given a prescription for a pain medicine. You can use extra strength Tylenol, Advil or Aleve as a substitute    We have spent considerable time and effort to make your surgical experience as efficient and pleasant as possible. We would appreciate your suggestions regarding any area of your care, which you think, could be improved to make your experience more pleasant.      If you have any questions, please call the office between 9:00 A.M. and 5:00 P.M.      If a problem should arise after hours, the doctor can be reached through the answering service at [phone]

## 2024-07-24 PROCEDURE — 88305 TISSUE EXAM BY PATHOLOGIST: CPT | Performed by: PATHOLOGY

## 2024-07-26 NOTE — OP NOTE
OPERATIVE REPORT  PATIENT NAME: Yahaira Cerrato    :  1980  MRN: [id number]  Pt Location:  OR ROOM 11    SURGERY DATE: 2024    Surgeons and Role:     * Daryl Nuñez MD - Primary     * Tahira Ojeda - Assisting    Preop Diagnosis:  Capsular contracture of left breast implant [T85.44XA]    Post-Op Diagnosis Codes:     * Capsular contracture of left breast implant [T85.44XA]    Procedure(s):  Left - LEFT BREAST IMPLANT EXCHANGE  Left capsulectomy    Specimen(s):  ID Type Source Tests Collected by Time Destination   1 : left breast capsule Tissue Breast, Left TISSUE EXAM Daryl Nuñez MD 2024 0805        Estimated Blood Loss:   Minimal    Drains:  Closed/Suction Drain Right RLQ Bulb 15 Fr. (Active)   Number of days: 1803       Closed/Suction Drain Left RLQ Bulb 15 Fr. (Active)   Number of days: 1803       Anesthesia Type:   General    Operative Indications:  Capsular contracture of left breast implant [T85.44XA]  Painful breast     Operative Findings:  Grade 4 capsular contracture       Complications:   None    Procedure and Technique:  Patient identified correctly correctly.  Intubated by anesthesia.  Placed supine position on the bed.  Both breasts were prepped and draped in sterile surgical fashion.  The breast was injected on the left side with 20 cc of quarter percent Marcaine with epinephrine.  We made an incision at the inframammary fold.  The old scar was excised out with a 15 blade.  Electrocautery dissection was used to dissect down onto the capsule.  We then went ahead and removed the entire capsule both in the anterior and posterior aspects.  The old implant was removed and found to be completely intact.  Hemostasis was then obtained.  We then had to redissect out the pocket.  Superiorly we anchored the muscle of the pectoralis major up onto the chest wall using 2-0 Vicryl sutures.  Due to the capsular contracture the implant migrated medially.  We reanchored the medial aspect of the  breast with 2-0 PDS.  We recreated the breast fold medially as well with an internal David flap with #1 strata fix PDO suture.  After the pocket was created it was irrigated out and washed with saline Betadine solution.  We then went ahead and placed the same size implant but new in the left pocket.  We had good symmetry after placement.  We then went ahead and closed her pocket with deep 2-0 Vicryl, 2-0 PDS.  The skin was closed with 2-0 Vicryl, 3-0 PDS in a running subcuticular 3-0 Monocryl stitch.  Patient tolerated the procedure well was awakened surgery taken recovery in stable condition.   I was present for the entire procedure.    Patient Disposition:  PACU         SIGNATURE: Daryl Nuñez MD  DATE: July 26, 2024  TIME: 7:11 AM

## 2024-08-02 ENCOUNTER — CLINICAL SUPPORT (OUTPATIENT)
Dept: FAMILY MEDICINE CLINIC | Facility: CLINIC | Age: 44
End: 2024-08-02
Payer: COMMERCIAL

## 2024-08-02 DIAGNOSIS — Z23 ENCOUNTER FOR IMMUNIZATION: Primary | ICD-10-CM

## 2024-08-02 DIAGNOSIS — Z23 NEED FOR MMR VACCINE: ICD-10-CM

## 2024-08-02 PROCEDURE — 90707 MMR VACCINE SC: CPT

## 2024-08-02 PROCEDURE — 90471 IMMUNIZATION ADMIN: CPT

## 2024-08-14 PROBLEM — Z12.4 CERVICAL CANCER SCREENING: Status: RESOLVED | Noted: 2024-07-15 | Resolved: 2024-08-14

## 2024-08-23 DIAGNOSIS — Z00.6 ENCOUNTER FOR EXAMINATION FOR NORMAL COMPARISON OR CONTROL IN CLINICAL RESEARCH PROGRAM: ICD-10-CM

## 2024-12-09 NOTE — PROGRESS NOTES
Patient here for ROUTINE GYN EXAM.    Patient is 44 y.o. year old female G 2 P 2.  She is here today for her routine gyn exam. She is using tubal liigation for birth control.     Menses every 28 days, duration x 3-4 days.  Pt does not smoke.  She denies alcohol/drug abuse.  She denies domestic violence/abuse.  She declines STD/HIV testing.  She denies problems with her breasts, bladder, or bowel.      Pt with + history of abnormal pap smears in her 20's,  colpo done and was negative.  Paps normal since per patient.   We discussed the current ACOG pap guidelines.  Last pap was 2013.      The patients medical, surgical, and family history was reviewed and updated.       Domestic violence screen: negative    She has no complaints today.     Last mammogram: no    Family History breast cancer:  no  Family History ovarian cancer: no  Family History colon cancer: no    Current Outpatient Medications on File Prior to Visit   Medication Sig Dispense Refill    cyanocobalamin 1,000 mcg/mL Inject 1,000 mcg into a muscle every 30 (thirty) days      ergocalciferol (VITAMIN D2) 50,000 units Take 1 capsule (50,000 Units total) by mouth every 14 (fourteen) days Please delivery by  to [address] [facility] [address] [place] [address]- payroll deduction-badge # [id number] 8 capsule 0    zafirlukast (ACCOLATE) 20 MG tablet Take 20 mg by mouth 2 (two) times a day       No current facility-administered medications on file prior to visit.       No Known Allergies    Past Surgical History:   Procedure Laterality Date    GASTRIC BYPASS  2008    PANNICULECTOMY N/A 8/19/2019    Procedure: PANNICULECTOMY;  Surgeon: Owen Velazquez MD;  Location: [facility];  Service: Plastics    ME BREAST AUGMENTATION WITH IMPLANT Left 10/30/2020    Procedure: IMPLANT EXCHANGE;  Surgeon: Daryl Nuñez MD;  Location:  MAIN OR;  Service: Plastics    ME INSJ/RPLCMT BREAST IMPLANT SEP DAY MASTECTOMY Left 7/19/2024    Procedure: LEFT BREAST IMPLANT  "EXCHANGE;  Surgeon: Daryl Nuñez MD;  Location:  MAIN OR;  Service: Plastics    VA MASTOPEXY Right 10/30/2020    Procedure: MASTOPEXY BREAST WITH AUGMENTATION;  Surgeon: Daryl Nuñez MD;  Location:  MAIN OR;  Service: Plastics    VA ISAI-IMPLANT CAPSULECTOMY BREAST COMPLETE Left 10/30/2020    Procedure: CAPSULECTOMY;  Surgeon: Daryl Nuñez MD;  Location:  MAIN OR;  Service: Plastics    REDUCTION MAMMAPLASTY  1994    TUBAL LIGATION             Review of Systems   Constitutional: Negative.    HENT: Negative.    Eyes: Negative.    Respiratory: Negative.    Cardiovascular: Negative.    Gastrointestinal: Negative.    Endocrine: Negative.    Genitourinary:        As noted in HPI   Musculoskeletal: Negative.    Skin: Negative.    Allergic/Immunologic: Negative.    Neurological: Negative.    Hematological: Negative.    Psychiatric/Behavioral: Negative      Vitals:    12/13/24 0853   BP: 130/80   BP Location: Right arm   Patient Position: Sitting   Cuff Size: Adult   Weight: 75 kg (165 lb 4.8 oz)   Height: 5' 2.5\" (1.588 m)       Chaperone was present during exam.    EXAM:    Neck: supple without nodes or thyromegaly  Heart: regular rate and rhythm  Lungs: clear to auscultation without rales, rhonci  Breasts: nontender, no masses, no discoloration, no discharge, + implants  Abdomen: soft, nontender, no masses  Ext genitalia: no lesions, no discoloration  Urethra: no discharge or erythema  Bladder: nontender, good support  Vagina: no discharge, no lesions  Cervix: no lesions, no cervical motion tenderness  Adnexa: nontender, no masses  Uterus: nontender, normal size and shape      Assessment & Plan  Well female exam with routine gynecological exam  Pap  done today.  Tubal ligation for birth control  mammogram ordered.       Visit for screening mammogram    Orders:    Mammo screening bilateral w 3d and cad; Future    Screening for cervical cancer    Orders:    Liquid-based pap, screening            "

## 2024-12-13 ENCOUNTER — APPOINTMENT (OUTPATIENT)
Dept: LAB | Facility: CLINIC | Age: 44
End: 2024-12-13
Payer: COMMERCIAL

## 2024-12-13 ENCOUNTER — OFFICE VISIT (OUTPATIENT)
Dept: OBGYN CLINIC | Facility: CLINIC | Age: 44
End: 2024-12-13
Payer: COMMERCIAL

## 2024-12-13 VITALS
BODY MASS INDEX: 29.29 KG/M2 | WEIGHT: 165.3 LBS | SYSTOLIC BLOOD PRESSURE: 130 MMHG | HEIGHT: 63 IN | DIASTOLIC BLOOD PRESSURE: 80 MMHG

## 2024-12-13 DIAGNOSIS — Z12.31 VISIT FOR SCREENING MAMMOGRAM: ICD-10-CM

## 2024-12-13 DIAGNOSIS — Z01.419 WELL FEMALE EXAM WITH ROUTINE GYNECOLOGICAL EXAM: Primary | ICD-10-CM

## 2024-12-13 DIAGNOSIS — Z00.6 ENCOUNTER FOR EXAMINATION FOR NORMAL COMPARISON OR CONTROL IN CLINICAL RESEARCH PROGRAM: ICD-10-CM

## 2024-12-13 DIAGNOSIS — Z12.4 SCREENING FOR CERVICAL CANCER: ICD-10-CM

## 2024-12-13 DIAGNOSIS — T85.44XA CAPSULAR CONTRACTURE OF BREAST IMPLANT, INITIAL ENCOUNTER: ICD-10-CM

## 2024-12-13 LAB
ALBUMIN SERPL BCG-MCNC: 3.9 G/DL (ref 3.5–5)
ALP SERPL-CCNC: 58 U/L (ref 34–104)
ALT SERPL W P-5'-P-CCNC: 14 U/L (ref 7–52)
AST SERPL W P-5'-P-CCNC: 17 U/L (ref 13–39)
BILIRUB DIRECT SERPL-MCNC: 0.03 MG/DL (ref 0–0.2)
BILIRUB SERPL-MCNC: 0.32 MG/DL (ref 0.2–1)
PROT SERPL-MCNC: 6.8 G/DL (ref 6.4–8.4)

## 2024-12-13 PROCEDURE — 80076 HEPATIC FUNCTION PANEL: CPT

## 2024-12-13 PROCEDURE — 36415 COLL VENOUS BLD VENIPUNCTURE: CPT

## 2024-12-13 PROCEDURE — G0145 SCR C/V CYTO,THINLAYER,RESCR: HCPCS | Performed by: PHYSICIAN ASSISTANT

## 2024-12-13 PROCEDURE — S0612 ANNUAL GYNECOLOGICAL EXAMINA: HCPCS | Performed by: PHYSICIAN ASSISTANT

## 2024-12-13 PROCEDURE — G0476 HPV COMBO ASSAY CA SCREEN: HCPCS | Performed by: PHYSICIAN ASSISTANT

## 2024-12-16 LAB
HPV HR 12 DNA CVX QL NAA+PROBE: NEGATIVE
HPV16 DNA CVX QL NAA+PROBE: POSITIVE
HPV18 DNA CVX QL NAA+PROBE: NEGATIVE

## 2024-12-17 ENCOUNTER — RESULTS FOLLOW-UP (OUTPATIENT)
Dept: OBGYN CLINIC | Facility: CLINIC | Age: 44
End: 2024-12-17

## 2024-12-24 ENCOUNTER — TELEPHONE (OUTPATIENT)
Dept: OBGYN CLINIC | Facility: CLINIC | Age: 44
End: 2024-12-24

## 2024-12-24 LAB
APOB+LDLR+PCSK9 GENE MUT ANL BLD/T: NOT DETECTED
BRCA1+BRCA2 DEL+DUP + FULL MUT ANL BLD/T: NOT DETECTED
LAB AP GYN PRIMARY INTERPRETATION: NORMAL
Lab: NORMAL
MLH1+MSH2+MSH6+PMS2 GN DEL+DUP+FUL M: NOT DETECTED

## 2024-12-24 NOTE — TELEPHONE ENCOUNTER
Left voice message for patient in regards to scheduling for a colposcopy. Advised patient to call the office to schedule.

## 2024-12-24 NOTE — TELEPHONE ENCOUNTER
Spoke with patient reveiwed that pap cytology is negative but HPV 16+.  Colposcopy recommended to make sure pap did not miss anything.   Patient aware and agreeable.    Chart to staff to schedule colpo

## 2025-04-09 ENCOUNTER — TELEPHONE (OUTPATIENT)
Dept: FAMILY MEDICINE CLINIC | Facility: CLINIC | Age: 45
End: 2025-04-09

## 2025-04-09 DIAGNOSIS — D51.3 OTHER DIETARY VITAMIN B12 DEFICIENCY ANEMIA: Primary | ICD-10-CM

## 2025-04-09 DIAGNOSIS — Z13.220 SCREENING, LIPID: Primary | ICD-10-CM

## 2025-04-09 DIAGNOSIS — Z98.84 STATUS POST BARIATRIC SURGERY: ICD-10-CM

## 2025-04-09 DIAGNOSIS — D50.8 OTHER IRON DEFICIENCY ANEMIA: ICD-10-CM

## 2025-04-09 DIAGNOSIS — E55.9 VITAMIN D DEFICIENCY: ICD-10-CM

## 2025-04-09 DIAGNOSIS — Z83.3 FAMILY HISTORY OF DIET-CONTROLLED DIABETES: ICD-10-CM

## 2025-04-11 ENCOUNTER — APPOINTMENT (OUTPATIENT)
Dept: LAB | Facility: CLINIC | Age: 45
End: 2025-04-11
Payer: COMMERCIAL

## 2025-04-11 DIAGNOSIS — Z13.220 SCREENING, LIPID: ICD-10-CM

## 2025-04-11 DIAGNOSIS — E55.9 VITAMIN D DEFICIENCY: ICD-10-CM

## 2025-04-11 DIAGNOSIS — Z83.3 FAMILY HISTORY OF DIET-CONTROLLED DIABETES: ICD-10-CM

## 2025-04-11 DIAGNOSIS — D50.8 OTHER IRON DEFICIENCY ANEMIA: ICD-10-CM

## 2025-04-11 DIAGNOSIS — Z98.84 STATUS POST BARIATRIC SURGERY: ICD-10-CM

## 2025-04-11 DIAGNOSIS — D51.3 OTHER DIETARY VITAMIN B12 DEFICIENCY ANEMIA: ICD-10-CM

## 2025-04-11 LAB
25(OH)D3 SERPL-MCNC: 15 NG/ML (ref 30–100)
ALBUMIN SERPL BCG-MCNC: 4 G/DL (ref 3.5–5)
ALP SERPL-CCNC: 65 U/L (ref 34–104)
ALT SERPL W P-5'-P-CCNC: 16 U/L (ref 7–52)
ANION GAP SERPL CALCULATED.3IONS-SCNC: 9 MMOL/L (ref 4–13)
AST SERPL W P-5'-P-CCNC: 18 U/L (ref 13–39)
BASOPHILS # BLD AUTO: 0.06 THOUSANDS/ÂΜL (ref 0–0.1)
BASOPHILS NFR BLD AUTO: 1 % (ref 0–1)
BILIRUB SERPL-MCNC: 0.54 MG/DL (ref 0.2–1)
BUN SERPL-MCNC: 13 MG/DL (ref 5–25)
CALCIUM SERPL-MCNC: 8.4 MG/DL (ref 8.4–10.2)
CHLORIDE SERPL-SCNC: 105 MMOL/L (ref 96–108)
CHOLEST SERPL-MCNC: 167 MG/DL (ref ?–200)
CO2 SERPL-SCNC: 25 MMOL/L (ref 21–32)
CREAT SERPL-MCNC: 0.7 MG/DL (ref 0.6–1.3)
EOSINOPHIL # BLD AUTO: 0.08 THOUSAND/ÂΜL (ref 0–0.61)
EOSINOPHIL NFR BLD AUTO: 1 % (ref 0–6)
ERYTHROCYTE [DISTWIDTH] IN BLOOD BY AUTOMATED COUNT: 17.6 % (ref 11.6–15.1)
EST. AVERAGE GLUCOSE BLD GHB EST-MCNC: 111 MG/DL
FERRITIN SERPL-MCNC: 5 NG/ML (ref 30–307)
FOLATE SERPL-MCNC: 6.7 NG/ML
GFR SERPL CREATININE-BSD FRML MDRD: 107 ML/MIN/1.73SQ M
GLUCOSE P FAST SERPL-MCNC: 116 MG/DL (ref 65–99)
HBA1C MFR BLD: 5.5 %
HCT VFR BLD AUTO: 39.1 % (ref 34.8–46.1)
HDLC SERPL-MCNC: 92 MG/DL
HGB BLD-MCNC: 12.2 G/DL (ref 11.5–15.4)
IMM GRANULOCYTES # BLD AUTO: 0.03 THOUSAND/UL (ref 0–0.2)
IMM GRANULOCYTES NFR BLD AUTO: 1 % (ref 0–2)
IRON SATN MFR SERPL: 16 % (ref 15–50)
IRON SERPL-MCNC: 86 UG/DL (ref 50–212)
LDLC SERPL CALC-MCNC: 58 MG/DL (ref 0–100)
LYMPHOCYTES # BLD AUTO: 1.78 THOUSANDS/ÂΜL (ref 0.6–4.47)
LYMPHOCYTES NFR BLD AUTO: 31 % (ref 14–44)
MCH RBC QN AUTO: 25.8 PG (ref 26.8–34.3)
MCHC RBC AUTO-ENTMCNC: 31.2 G/DL (ref 31.4–37.4)
MCV RBC AUTO: 83 FL (ref 82–98)
MONOCYTES # BLD AUTO: 0.53 THOUSAND/ÂΜL (ref 0.17–1.22)
MONOCYTES NFR BLD AUTO: 9 % (ref 4–12)
NEUTROPHILS # BLD AUTO: 3.22 THOUSANDS/ÂΜL (ref 1.85–7.62)
NEUTS SEG NFR BLD AUTO: 57 % (ref 43–75)
NONHDLC SERPL-MCNC: 75 MG/DL
NRBC BLD AUTO-RTO: 0 /100 WBCS
PLATELET # BLD AUTO: 378 THOUSANDS/UL (ref 149–390)
PMV BLD AUTO: 9.8 FL (ref 8.9–12.7)
POTASSIUM SERPL-SCNC: 4.2 MMOL/L (ref 3.5–5.3)
PROT SERPL-MCNC: 6.9 G/DL (ref 6.4–8.4)
RBC # BLD AUTO: 4.72 MILLION/UL (ref 3.81–5.12)
SODIUM SERPL-SCNC: 139 MMOL/L (ref 135–147)
TIBC SERPL-MCNC: 540.4 UG/DL (ref 250–450)
TRANSFERRIN SERPL-MCNC: 386 MG/DL (ref 203–362)
TRIGL SERPL-MCNC: 85 MG/DL (ref ?–150)
UIBC SERPL-MCNC: 454 UG/DL (ref 155–355)
VIT B12 SERPL-MCNC: 354 PG/ML (ref 180–914)
WBC # BLD AUTO: 5.7 THOUSAND/UL (ref 4.31–10.16)

## 2025-04-11 PROCEDURE — 80053 COMPREHEN METABOLIC PANEL: CPT

## 2025-04-11 PROCEDURE — 82728 ASSAY OF FERRITIN: CPT

## 2025-04-11 PROCEDURE — 82607 VITAMIN B-12: CPT

## 2025-04-11 PROCEDURE — 83550 IRON BINDING TEST: CPT

## 2025-04-11 PROCEDURE — 82746 ASSAY OF FOLIC ACID SERUM: CPT

## 2025-04-11 PROCEDURE — 36415 COLL VENOUS BLD VENIPUNCTURE: CPT

## 2025-04-11 PROCEDURE — 84425 ASSAY OF VITAMIN B-1: CPT

## 2025-04-11 PROCEDURE — 83036 HEMOGLOBIN GLYCOSYLATED A1C: CPT

## 2025-04-11 PROCEDURE — 80061 LIPID PANEL: CPT

## 2025-04-11 PROCEDURE — 82306 VITAMIN D 25 HYDROXY: CPT

## 2025-04-11 PROCEDURE — 85025 COMPLETE CBC W/AUTO DIFF WBC: CPT

## 2025-04-11 PROCEDURE — 83540 ASSAY OF IRON: CPT

## 2025-04-14 DIAGNOSIS — E55.9 VITAMIN D DEFICIENCY: ICD-10-CM

## 2025-04-14 DIAGNOSIS — Z98.84 STATUS POST BARIATRIC SURGERY: ICD-10-CM

## 2025-04-14 DIAGNOSIS — D50.8 IRON DEFICIENCY ANEMIA AFTER GASTRECTOMY: Primary | ICD-10-CM

## 2025-04-14 DIAGNOSIS — K91.89 IRON DEFICIENCY ANEMIA AFTER GASTRECTOMY: Primary | ICD-10-CM

## 2025-04-14 RX ORDER — SODIUM CHLORIDE 9 MG/ML
20 INJECTION, SOLUTION INTRAVENOUS ONCE
Status: CANCELLED | OUTPATIENT
Start: 2025-04-18

## 2025-04-14 RX ORDER — ERGOCALCIFEROL 1.25 MG/1
50000 CAPSULE, LIQUID FILLED ORAL WEEKLY
Qty: 12 CAPSULE | Refills: 2 | Status: SHIPPED | OUTPATIENT
Start: 2025-04-14

## 2025-04-15 ENCOUNTER — RESULTS FOLLOW-UP (OUTPATIENT)
Dept: FAMILY MEDICINE CLINIC | Facility: CLINIC | Age: 45
End: 2025-04-15

## 2025-04-16 LAB — VIT B1 BLD-SCNC: 87.2 NMOL/L (ref 66.5–200)

## 2025-04-18 ENCOUNTER — HOSPITAL ENCOUNTER (OUTPATIENT)
Dept: INFUSION CENTER | Facility: HOSPITAL | Age: 45
End: 2025-04-18
Attending: FAMILY MEDICINE
Payer: COMMERCIAL

## 2025-04-18 VITALS
SYSTOLIC BLOOD PRESSURE: 147 MMHG | HEART RATE: 62 BPM | TEMPERATURE: 97 F | DIASTOLIC BLOOD PRESSURE: 87 MMHG | RESPIRATION RATE: 18 BRPM

## 2025-04-18 DIAGNOSIS — Z98.84 STATUS POST BARIATRIC SURGERY: Primary | ICD-10-CM

## 2025-04-18 DIAGNOSIS — D50.8 IRON DEFICIENCY ANEMIA AFTER GASTRECTOMY: ICD-10-CM

## 2025-04-18 DIAGNOSIS — K91.89 IRON DEFICIENCY ANEMIA AFTER GASTRECTOMY: ICD-10-CM

## 2025-04-18 PROCEDURE — 96365 THER/PROPH/DIAG IV INF INIT: CPT

## 2025-04-18 RX ORDER — SODIUM CHLORIDE 9 MG/ML
20 INJECTION, SOLUTION INTRAVENOUS ONCE
Status: CANCELLED | OUTPATIENT
Start: 2025-04-26

## 2025-04-18 RX ORDER — SODIUM CHLORIDE 9 MG/ML
20 INJECTION, SOLUTION INTRAVENOUS ONCE
Status: COMPLETED | OUTPATIENT
Start: 2025-04-18 | End: 2025-04-18

## 2025-04-18 RX ADMIN — SODIUM CHLORIDE 20 ML/HR: 0.9 INJECTION, SOLUTION INTRAVENOUS at 08:48

## 2025-04-18 RX ADMIN — IRON SUCROSE 200 MG: 20 INJECTION, SOLUTION INTRAVENOUS at 08:49

## 2025-04-18 NOTE — PROGRESS NOTES
Yahaira Cerrato  tolerated treatment well with no complications.      Yahaira Cerrato is aware of future appt on 04/26/25 at 10am.     AVS printed and given to Yahaira Cerrato:    No (Declined by Yahaira Cerrato)

## 2025-04-26 ENCOUNTER — HOSPITAL ENCOUNTER (OUTPATIENT)
Dept: INFUSION CENTER | Facility: HOSPITAL | Age: 45
Discharge: HOME | End: 2025-04-26
Attending: FAMILY MEDICINE
Payer: COMMERCIAL

## 2025-04-26 VITALS
RESPIRATION RATE: 18 BRPM | TEMPERATURE: 97.2 F | HEART RATE: 81 BPM | DIASTOLIC BLOOD PRESSURE: 64 MMHG | SYSTOLIC BLOOD PRESSURE: 119 MMHG

## 2025-04-26 DIAGNOSIS — D50.8 IRON DEFICIENCY ANEMIA AFTER GASTRECTOMY: ICD-10-CM

## 2025-04-26 DIAGNOSIS — Z98.84 STATUS POST BARIATRIC SURGERY: Primary | ICD-10-CM

## 2025-04-26 DIAGNOSIS — K91.89 IRON DEFICIENCY ANEMIA AFTER GASTRECTOMY: ICD-10-CM

## 2025-04-26 PROCEDURE — 96365 THER/PROPH/DIAG IV INF INIT: CPT

## 2025-04-26 RX ORDER — SODIUM CHLORIDE 9 MG/ML
20 INJECTION, SOLUTION INTRAVENOUS ONCE
OUTPATIENT
Start: 2025-05-05

## 2025-04-26 RX ORDER — SODIUM CHLORIDE 9 MG/ML
20 INJECTION, SOLUTION INTRAVENOUS ONCE
Status: COMPLETED | OUTPATIENT
Start: 2025-04-26 | End: 2025-04-26

## 2025-04-26 RX ADMIN — SODIUM CHLORIDE 20 ML/HR: 0.9 INJECTION, SOLUTION INTRAVENOUS at 09:47

## 2025-04-26 RX ADMIN — IRON SUCROSE 200 MG: 20 INJECTION, SOLUTION INTRAVENOUS at 09:47

## 2025-04-26 NOTE — PROGRESS NOTES
Yahaira Cerrato  tolerated treatment well with no complications.      Yahaira Cerrato is aware of future appt on 05/05/2025 at 8:30 am.     Venofer given without incident, patient declined printed AVS.  Patient to return to department as scheduled.

## 2025-05-05 ENCOUNTER — HOSPITAL ENCOUNTER (OUTPATIENT)
Dept: INFUSION CENTER | Facility: CLINIC | Age: 45
Discharge: HOME | End: 2025-05-05
Attending: FAMILY MEDICINE
Payer: COMMERCIAL

## 2025-05-05 VITALS
DIASTOLIC BLOOD PRESSURE: 84 MMHG | SYSTOLIC BLOOD PRESSURE: 146 MMHG | TEMPERATURE: 97.4 F | RESPIRATION RATE: 18 BRPM | HEART RATE: 69 BPM

## 2025-05-05 DIAGNOSIS — D50.8 IRON DEFICIENCY ANEMIA AFTER GASTRECTOMY: ICD-10-CM

## 2025-05-05 DIAGNOSIS — K91.89 IRON DEFICIENCY ANEMIA AFTER GASTRECTOMY: ICD-10-CM

## 2025-05-05 DIAGNOSIS — Z98.84 STATUS POST BARIATRIC SURGERY: Primary | ICD-10-CM

## 2025-05-05 PROCEDURE — 96365 THER/PROPH/DIAG IV INF INIT: CPT

## 2025-05-05 RX ORDER — SODIUM CHLORIDE 9 MG/ML
20 INJECTION, SOLUTION INTRAVENOUS ONCE
Status: COMPLETED | OUTPATIENT
Start: 2025-05-05 | End: 2025-05-05

## 2025-05-05 RX ORDER — SODIUM CHLORIDE 9 MG/ML
20 INJECTION, SOLUTION INTRAVENOUS ONCE
Status: CANCELLED | OUTPATIENT
Start: 2025-05-12

## 2025-05-05 RX ADMIN — SODIUM CHLORIDE 20 ML/HR: 9 INJECTION, SOLUTION INTRAVENOUS at 08:21

## 2025-05-05 RX ADMIN — IRON SUCROSE 200 MG: 20 INJECTION, SOLUTION INTRAVENOUS at 08:21

## 2025-05-05 NOTE — PROGRESS NOTES
Yahaira Cerrato  tolerated treatment well with no complications.      Yahaira Cerrato is aware of future appt on Monday May 12, 2025 9:30 AM.     AVS declined by Yahaira Cerrato.

## 2025-05-12 ENCOUNTER — HOSPITAL ENCOUNTER (OUTPATIENT)
Dept: INFUSION CENTER | Facility: CLINIC | Age: 45
Discharge: HOME | End: 2025-05-12
Attending: FAMILY MEDICINE
Payer: COMMERCIAL

## 2025-05-12 VITALS
HEART RATE: 66 BPM | TEMPERATURE: 97.9 F | RESPIRATION RATE: 18 BRPM | SYSTOLIC BLOOD PRESSURE: 139 MMHG | DIASTOLIC BLOOD PRESSURE: 82 MMHG

## 2025-05-12 DIAGNOSIS — D50.8 IRON DEFICIENCY ANEMIA AFTER GASTRECTOMY: ICD-10-CM

## 2025-05-12 DIAGNOSIS — Z98.84 STATUS POST BARIATRIC SURGERY: Primary | ICD-10-CM

## 2025-05-12 DIAGNOSIS — K91.89 IRON DEFICIENCY ANEMIA AFTER GASTRECTOMY: ICD-10-CM

## 2025-05-12 PROCEDURE — 96365 THER/PROPH/DIAG IV INF INIT: CPT

## 2025-05-12 RX ORDER — SODIUM CHLORIDE 9 MG/ML
20 INJECTION, SOLUTION INTRAVENOUS ONCE
Status: COMPLETED | OUTPATIENT
Start: 2025-05-12 | End: 2025-05-12

## 2025-05-12 RX ORDER — SODIUM CHLORIDE 9 MG/ML
20 INJECTION, SOLUTION INTRAVENOUS ONCE
Status: CANCELLED | OUTPATIENT
Start: 2025-05-19

## 2025-05-12 RX ADMIN — IRON SUCROSE 200 MG: 20 INJECTION, SOLUTION INTRAVENOUS at 08:18

## 2025-05-12 RX ADMIN — SODIUM CHLORIDE 20 ML/HR: 9 INJECTION, SOLUTION INTRAVENOUS at 08:18

## 2025-05-12 NOTE — PROGRESS NOTES
Yahaira Cerrato  tolerated treatment well with no complications.      Yahaira Cerrato is aware of future appt on Monday May 19, 2025 8:00 AM.     AVS printed and given to Yahaira Cerrato.

## 2025-05-12 NOTE — LETTER
[facility]  [address], [place] [address]  [facility] [address]  Dept: [phone]    May 12, 2025     Patient: Yahaira Cerrato   YOB: 1980   Date of Visit: 5/12/2025       To Whom it May Concern:    Yahaira Cerrato is under my professional care. She was seen in the hospital from 5/12/2025 to 05/12/25.     If you have any questions or concerns, please don't hesitate to call.         Sincerely,          Pedro Otero RN

## 2025-05-12 NOTE — PLAN OF CARE
Problem: Potential for Falls  Goal: Patient will remain free of falls  Description: INTERVENTIONS:- Educate patient/family on patient safety including physical limitations- Instruct patient to call for assistance with activity - Consult OT/PT to assist with strengthening/mobility - Keep Call bell within reach- Keep bed low and locked with side rails adjusted as appropriate- Keep care items and personal belongings within reach- Initiate and maintain comfort rounds- Make Fall Risk Sign visible to staff- Consider moving patient to room near nurses station  Outcome: Completed

## 2025-05-19 ENCOUNTER — HOSPITAL ENCOUNTER (OUTPATIENT)
Dept: INFUSION CENTER | Facility: CLINIC | Age: 45
Discharge: HOME | End: 2025-05-19
Attending: FAMILY MEDICINE
Payer: COMMERCIAL

## 2025-05-19 VITALS
HEART RATE: 65 BPM | TEMPERATURE: 97.6 F | DIASTOLIC BLOOD PRESSURE: 79 MMHG | SYSTOLIC BLOOD PRESSURE: 125 MMHG | RESPIRATION RATE: 16 BRPM

## 2025-05-19 DIAGNOSIS — K91.89 IRON DEFICIENCY ANEMIA AFTER GASTRECTOMY: ICD-10-CM

## 2025-05-19 DIAGNOSIS — Z98.84 STATUS POST BARIATRIC SURGERY: Primary | ICD-10-CM

## 2025-05-19 DIAGNOSIS — D50.8 IRON DEFICIENCY ANEMIA AFTER GASTRECTOMY: ICD-10-CM

## 2025-05-19 RX ORDER — SODIUM CHLORIDE 9 MG/ML
20 INJECTION, SOLUTION INTRAVENOUS ONCE
Status: CANCELLED | OUTPATIENT
Start: 2025-05-19

## 2025-05-19 RX ORDER — SODIUM CHLORIDE 9 MG/ML
20 INJECTION, SOLUTION INTRAVENOUS ONCE
Status: COMPLETED | OUTPATIENT
Start: 2025-05-19 | End: 2025-05-19

## 2025-05-19 RX ADMIN — IRON SUCROSE 200 MG: 20 INJECTION, SOLUTION INTRAVENOUS at 08:18

## 2025-05-19 RX ADMIN — SODIUM CHLORIDE 20 ML/HR: 0.9 INJECTION, SOLUTION INTRAVENOUS at 08:17

## 2025-05-19 NOTE — PROGRESS NOTES
Pt tolerated venofer infusion today without incident.  Pt declined AVS but is aware this is her last scheduled iron infusion.

## 2025-05-29 ENCOUNTER — TELEPHONE (OUTPATIENT)
Dept: FAMILY MEDICINE CLINIC | Facility: CLINIC | Age: 45
End: 2025-05-29

## 2025-05-30 DIAGNOSIS — D50.8 IRON DEFICIENCY ANEMIA AFTER GASTRECTOMY: Primary | ICD-10-CM

## 2025-05-30 DIAGNOSIS — K91.89 IRON DEFICIENCY ANEMIA AFTER GASTRECTOMY: Primary | ICD-10-CM

## 2025-06-12 ENCOUNTER — APPOINTMENT (OUTPATIENT)
Dept: LAB | Facility: CLINIC | Age: 45
End: 2025-06-12
Payer: COMMERCIAL

## 2025-06-12 DIAGNOSIS — K91.89 IRON DEFICIENCY ANEMIA AFTER GASTRECTOMY: ICD-10-CM

## 2025-06-12 DIAGNOSIS — D50.8 IRON DEFICIENCY ANEMIA AFTER GASTRECTOMY: ICD-10-CM

## 2025-06-12 LAB
IRON SATN MFR SERPL: 51 % (ref 15–50)
IRON SERPL-MCNC: 198 UG/DL (ref 50–212)
TIBC SERPL-MCNC: 389.2 UG/DL (ref 250–450)
TRANSFERRIN SERPL-MCNC: 278 MG/DL (ref 203–362)
UIBC SERPL-MCNC: 191 UG/DL (ref 155–355)

## 2025-06-12 PROCEDURE — 83550 IRON BINDING TEST: CPT

## 2025-06-12 PROCEDURE — 82728 ASSAY OF FERRITIN: CPT

## 2025-06-12 PROCEDURE — 36415 COLL VENOUS BLD VENIPUNCTURE: CPT

## 2025-06-12 PROCEDURE — 83540 ASSAY OF IRON: CPT

## 2025-06-13 ENCOUNTER — RESULTS FOLLOW-UP (OUTPATIENT)
Dept: FAMILY MEDICINE CLINIC | Facility: CLINIC | Age: 45
End: 2025-06-13

## 2025-06-13 DIAGNOSIS — D50.8 IRON DEFICIENCY ANEMIA AFTER GASTRECTOMY: Primary | ICD-10-CM

## 2025-06-13 DIAGNOSIS — K91.89 IRON DEFICIENCY ANEMIA AFTER GASTRECTOMY: Primary | ICD-10-CM

## 2025-06-13 LAB — FERRITIN SERPL-MCNC: 85 NG/ML (ref 30–307)

## 2025-06-13 NOTE — TELEPHONE ENCOUNTER
Discussed with patient.  There was no ferritin in this blood work.  Will order ferritin and then call lab to see if they can add it on.

## (undated) DEVICE — ADHESIVE SKN CLSR HISTOACRYL FLEX 0.5ML LF

## (undated) DEVICE — STANDARD SURGICAL GOWN, L: Brand: CONVERTORS

## (undated) DEVICE — SCD SEQUENTIAL COMPRESSION COMFORT SLEEVE MEDIUM KNEE LENGTH: Brand: KENDALL SCD

## (undated) DEVICE — SPONGE STICK WITH PVP-I: Brand: KENDALL

## (undated) DEVICE — SYRINGE 30ML LL

## (undated) DEVICE — ADHESIVE SKIN HIGH VISCOSITY EXOFIN 1ML

## (undated) DEVICE — DRAPE SHEET THREE QUARTER

## (undated) DEVICE — PREVENA PLUS INCISION MANAGEMENT SYSTEM: Brand: PREVENA PLUS™

## (undated) DEVICE — DRESSING TELFA 2 X 3 IN STRL

## (undated) DEVICE — SUT MONOCRYL 4-0 P-3 18 IN Y494G

## (undated) DEVICE — SUT STRATAFIX SMTR PDS PLUS 0 CT-1 60CM SXPP1A435

## (undated) DEVICE — INTENDED FOR TISSUE SEPARATION, AND OTHER PROCEDURES THAT REQUIRE A SHARP SURGICAL BLADE TO PUNCTURE OR CUT.: Brand: BARD-PARKER ® CARBON RIB-BACK BLADES

## (undated) DEVICE — SYRINGE 10ML LL

## (undated) DEVICE — PLUMEPEN PRO 10FT

## (undated) DEVICE — GLOVE SRG BIOGEL ECLIPSE 8

## (undated) DEVICE — INTENDED FOR TISSUE SEPARATION, AND OTHER PROCEDURES THAT REQUIRE A SHARP SURGICAL BLADE TO PUNCTURE OR CUT.: Brand: BARD-PARKER ® SAFETYLOCK CARBON RIB-BACK BLADES

## (undated) DEVICE — STERILE POLYISOPRENE POWDER-FREE SURGICAL GLOVES: Brand: PROTEXIS

## (undated) DEVICE — JACKSON-PRATT 100CC BULB RESERVOIR: Brand: CARDINAL HEALTH

## (undated) DEVICE — TUBING SUCTION 5MM X 12 FT

## (undated) DEVICE — PAD GROUNDING ADULT

## (undated) DEVICE — 3M™ TEGADERM™ TRANSPARENT FILM DRESSING FRAME STYLE, 1624W, 2-3/8 IN X 2-3/4 IN (6 CM X 7 CM), 100/CT 4CT/CASE: Brand: 3M™ TEGADERM™

## (undated) DEVICE — POV-IOD SOLUTION 4OZ BT

## (undated) DEVICE — SUT MONOCRYL 3-0 SH 27 IN Y416H

## (undated) DEVICE — BULB SYRINGE,IRRIGATION WITH PROTECTIVE CAP: Brand: DOVER

## (undated) DEVICE — NEEDLE 25G X 1 1/2

## (undated) DEVICE — GLOVE INDICATOR PI UNDERGLOVE SZ 8.5 BLUE

## (undated) DEVICE — HIGH PROFILE XTRA, GEL SIZER FOR USE WITH SHPX-595: Brand: MENTOR MEMORYGEL XTRA RESTERILIZABLE GEL SIZER

## (undated) DEVICE — CHEST/BREAST DRAPE: Brand: CONVERTORS

## (undated) DEVICE — DRESSING BIOPATCH ANTIMICROBIAL 1 IN DISC

## (undated) DEVICE — 3M™ TEGADERM™ TRANSPARENT FILM DRESSING FRAME STYLE, 1626W, 4 IN X 4-3/4 IN (10 CM X 12 CM), 50/CT 4CT/CASE: Brand: 3M™ TEGADERM™

## (undated) DEVICE — SKIN MARKER DUAL TIP WITH RULER CAP, FLEXIBLE RULER AND LABELS: Brand: DEVON

## (undated) DEVICE — ELECTRODE BLADE MOD E-Z CLEAN 2.5IN 6.4CM -0012M

## (undated) DEVICE — SUT PDS II 3-0 SH 27 IN Z316H

## (undated) DEVICE — NEEDLE BLUNT 18 G X 1 1/2IN

## (undated) DEVICE — SUT MONOCRYL 3-0 PS-2 27 IN Y427H

## (undated) DEVICE — BASIC PACK: Brand: CONVERTORS

## (undated) DEVICE — LIGHT GLOVE GREEN

## (undated) DEVICE — 1820 FOAM BLOCK NEEDLE COUNTER: Brand: DEVON

## (undated) DEVICE — SINGLE PORT MANIFOLD: Brand: NEPTUNE 2

## (undated) DEVICE — PENCIL ELECTROSURG E-Z CLEAN -0035H

## (undated) DEVICE — DRESSING XEROFORM 5 X 9

## (undated) DEVICE — ABDOMINAL PAD: Brand: DERMACEA

## (undated) DEVICE — SUT ETHILON 3-0 PS-1 18 IN 1663H

## (undated) DEVICE — UTILITY MARKER,BLACK WITH LABELS: Brand: DEVON

## (undated) DEVICE — SUT PDS II 2-0 SH 27 IN Z317H

## (undated) DEVICE — OCCLUSIVE GAUZE STRIP,3% BISMUTH TRIBROMOPHENATE IN PETROLATUM BLEND: Brand: XEROFORM

## (undated) DEVICE — CHLORAPREP HI-LITE 26ML ORANGE

## (undated) DEVICE — STERILE POLYISOPRENE POWDER-FREE SURGICAL GLOVES WITH EMOLLIENT COATING: Brand: PROTEXIS

## (undated) DEVICE — SUT PROLENE 0 CT-1 30 IN 8424H

## (undated) DEVICE — BETHLEHEM UNIVERSAL BREAST PK: Brand: CARDINAL HEALTH

## (undated) DEVICE — DISPOSABLE OR TOWEL: Brand: CARDINAL HEALTH

## (undated) DEVICE — STERILE MUSCLE FLAP PACK: Brand: CARDINAL HEALTH

## (undated) DEVICE — EXOFIN PRECISION PEN HIGH VISCOSITY TOPICAL SKIN ADHESIVE: Brand: EXOFIN PRECISION PEN, 1G

## (undated) DEVICE — SUT VICRYL 2-0 CT-1 27 IN J259H

## (undated) DEVICE — SUT MONOCRYL 4-0 PS-2 18 IN Y496G

## (undated) DEVICE — JP CHAN DRN SIL HUBLESS 15FR W/TRO: Brand: CARDINAL HEALTH

## (undated) DEVICE — ASTOUND STANDARD SURGICAL GOWN, XXL: Brand: CONVERTORS

## (undated) DEVICE — ELECTRODE BLADE MOD  E-Z CLEAN 6.5IN -0014M

## (undated) DEVICE — SPONGE LAP 18 X 18 IN STRL RFD

## (undated) DEVICE — PROXIMATE SKIN STAPLERS (35 WIDE) CONTAINS 35 STAINLESS STEEL STAPLES (FIXED HEAD): Brand: PROXIMATE

## (undated) DEVICE — ELECTRODE BLADE MOD E-Z CLEAN  2.75IN 7CM -0012AM

## (undated) DEVICE — UNDYED MONOFILAMENT (POLYDIOXANONE), ABSORBABLE SURGICAL SUTURE: Brand: PDS

## (undated) DEVICE — SUT STRATAFIX SPIRAL MONOCRYL PLUS 3-0 PS-2 45CM SXMP1B107

## (undated) DEVICE — STAPLER INSORB SUBCUTICULAR 30 SINGLE USE

## (undated) DEVICE — DRAIN HUBLESS 15FR 3 1/16IN

## (undated) DEVICE — LIGACLIP MCA MULTIPLE CLIP APPLIERS, 20 MEDIUM CLIPS: Brand: LIGACLIP